# Patient Record
Sex: MALE | Race: WHITE | HISPANIC OR LATINO | URBAN - METROPOLITAN AREA
[De-identification: names, ages, dates, MRNs, and addresses within clinical notes are randomized per-mention and may not be internally consistent; named-entity substitution may affect disease eponyms.]

---

## 2020-08-31 ENCOUNTER — INPATIENT (INPATIENT)
Facility: HOSPITAL | Age: 35
LOS: 24 days | Discharge: HOME | DRG: 753 | End: 2020-09-25
Attending: PSYCHIATRY & NEUROLOGY | Admitting: PSYCHIATRY & NEUROLOGY
Payer: MEDICAID

## 2020-08-31 VITALS — WEIGHT: 165.35 LBS | RESPIRATION RATE: 18 BRPM | OXYGEN SATURATION: 99 % | HEIGHT: 68 IN | TEMPERATURE: 98 F

## 2020-08-31 DIAGNOSIS — F31.9 BIPOLAR DISORDER, UNSPECIFIED: ICD-10-CM

## 2020-08-31 DIAGNOSIS — F12.10 CANNABIS ABUSE, UNCOMPLICATED: ICD-10-CM

## 2020-08-31 DIAGNOSIS — F31.64 BIPOLAR DISORDER, CURRENT EPISODE MIXED, SEVERE, WITH PSYCHOTIC FEATURES: ICD-10-CM

## 2020-08-31 DIAGNOSIS — F31.2 BIPOLAR DISORDER, CURRENT EPISODE MANIC SEVERE WITH PSYCHOTIC FEATURES: ICD-10-CM

## 2020-08-31 PROCEDURE — 80053 COMPREHEN METABOLIC PANEL: CPT

## 2020-08-31 PROCEDURE — 84443 ASSAY THYROID STIM HORMONE: CPT

## 2020-08-31 PROCEDURE — 83036 HEMOGLOBIN GLYCOSYLATED A1C: CPT

## 2020-08-31 PROCEDURE — 80061 LIPID PANEL: CPT

## 2020-08-31 PROCEDURE — 93005 ELECTROCARDIOGRAM TRACING: CPT

## 2020-08-31 PROCEDURE — 82962 GLUCOSE BLOOD TEST: CPT

## 2020-08-31 PROCEDURE — 99223 1ST HOSP IP/OBS HIGH 75: CPT

## 2020-08-31 PROCEDURE — 84484 ASSAY OF TROPONIN QUANT: CPT

## 2020-08-31 PROCEDURE — 85025 COMPLETE CBC W/AUTO DIFF WBC: CPT

## 2020-08-31 PROCEDURE — 36415 COLL VENOUS BLD VENIPUNCTURE: CPT

## 2020-08-31 PROCEDURE — 93970 EXTREMITY STUDY: CPT

## 2020-08-31 PROCEDURE — 85379 FIBRIN DEGRADATION QUANT: CPT

## 2020-08-31 RX ORDER — MAGNESIUM HYDROXIDE 400 MG/1
30 TABLET, CHEWABLE ORAL DAILY
Refills: 0 | Status: DISCONTINUED | OUTPATIENT
Start: 2020-08-31 | End: 2020-09-24

## 2020-08-31 RX ORDER — RISPERIDONE 4 MG/1
1 TABLET ORAL
Refills: 0 | Status: DISCONTINUED | OUTPATIENT
Start: 2020-08-31 | End: 2020-09-03

## 2020-08-31 RX ORDER — ACETAMINOPHEN 500 MG
650 TABLET ORAL EVERY 6 HOURS
Refills: 0 | Status: DISCONTINUED | OUTPATIENT
Start: 2020-08-31 | End: 2020-09-24

## 2020-08-31 RX ORDER — DIPHENHYDRAMINE HCL 50 MG
50 CAPSULE ORAL EVERY 6 HOURS
Refills: 0 | Status: DISCONTINUED | OUTPATIENT
Start: 2020-08-31 | End: 2020-09-24

## 2020-08-31 RX ORDER — TRAZODONE HCL 50 MG
50 TABLET ORAL AT BEDTIME
Refills: 0 | Status: DISCONTINUED | OUTPATIENT
Start: 2020-08-31 | End: 2020-09-25

## 2020-08-31 RX ORDER — HALOPERIDOL DECANOATE 100 MG/ML
5 INJECTION INTRAMUSCULAR EVERY 6 HOURS
Refills: 0 | Status: DISCONTINUED | OUTPATIENT
Start: 2020-08-31 | End: 2020-09-24

## 2020-08-31 RX ORDER — HALOPERIDOL DECANOATE 100 MG/ML
5 INJECTION INTRAMUSCULAR EVERY 6 HOURS
Refills: 0 | Status: DISCONTINUED | OUTPATIENT
Start: 2020-08-31 | End: 2020-09-12

## 2020-08-31 NOTE — CONSULT NOTE ADULT - ASSESSMENT
34 y/o male with no significant medical history admitted to inpatient psychiatry for psychosis.     # Psychosis  -Management per primary psych team    # 34 y/o male with no significant medical history admitted to inpatient psychiatry for psychosis.     # Psychosis  -Management per primary psych team    # Cannabis abuse  -Encourage cessation     DVT ppx  Encourage ambulation

## 2020-08-31 NOTE — BEHAVIORAL HEALTH ASSESSMENT NOTE - PROBLEM SELECTOR PLAN 2
encourage to attend groups for insight and relapse prevention encourage the pt to attend groups for relapse prevention plan

## 2020-08-31 NOTE — BEHAVIORAL HEALTH ASSESSMENT NOTE - OTHER
pt is superficially cooperative, is guarded unkempt pt denies any hallucinations but appears preoccupied

## 2020-08-31 NOTE — PATIENT PROFILE BEHAVIORAL HEALTH - FALL HARM RISK CONCLUSION
having urinary retention since my catheter was removed tonight; I noticed there's a lot of blood in my catheter Universal Safety Interventions

## 2020-08-31 NOTE — BEHAVIORAL HEALTH ASSESSMENT NOTE - NSBHADMITIPSTRENGTH_PSY_A_CORE
Has supportive interpersonal relationships with family, friends or peers/Has access to housing/residential stability/Intelligent

## 2020-08-31 NOTE — BEHAVIORAL HEALTH ASSESSMENT NOTE - NSBHSOCIALHXDETAILSFT_PSY_A_CORE
pt not believing he has a disorder and is refusing medication as he fears "it would mess up my mind"

## 2020-08-31 NOTE — BEHAVIORAL HEALTH ASSESSMENT NOTE - SUICIDE PROTECTIVE FACTORS
Supportive social network of family or friends Catholic beliefs/Supportive social network of family or friends

## 2020-08-31 NOTE — BEHAVIORAL HEALTH ASSESSMENT NOTE - HPI (INCLUDE ILLNESS QUALITY, SEVERITY, DURATION, TIMING, CONTEXT, MODIFYING FACTORS, ASSOCIATED SIGNS AND SYMPTOMS)
Pt is a 35 yr old Cacasian single male , no prior psych hospitalization hx, BIB family to [facility] , with homicidal ideation of wanting to kill his brother on the direction of GOD so that the brother  can be reborn as the patient's son. According to the Pt's father James , pt smoking cannabis daily, not sleeping, talking to self. Pt is a 35 yr old  single male , no prior psych hospitalization hx, BIB family to [facility] , with homicidal ideation of wanting to kill his brother on the direction of GOD so that the brother  can be reborn as the patient's son. According to the Pt's father James , pt smoking cannabis daily, not sleeping, talking to self.  Pt apparently with poor sleep and response to internal stimuli  since 2019 according to the father but he was not brought in for treatment until he started to voice homicidal ideations.  Pt with grandiose delusions. Pt with no thought of needing treatment    According to referral material from [facility] pt indicated that he had a traumatic break up with his girlfriend  of 5 years in Dec 2019 . Pt had since then had "been in contact with GOD and was writing testaments in the bible ." Claimed that he was exercising more, he was denying any homicidal ideation and claimed that he did not know why the family would say he said this." Claims he would get "GOD 's thoughts in his head seeing the holy spirit move his computer in the middle of the night.

## 2020-08-31 NOTE — CONSULT NOTE ADULT - ATTENDING COMMENTS
Case discussed and reviewed in detail. Please note my plan below     34 y/o M with no significant PMH, p/w psychosis and admitted to psych service. No medical complaints reported    *Psychosis   -Management as per psych    *DVT ppx  -Encourage ambulation Case discussed and reviewed in detail. Please note my plan below     34 y/o M with no significant PMH, p/w psychosis and admitted to psych service. No medical complaints reported    *Psychosis   -Management as per psych    *Will need to f/u pending labs     *DVT ppx  -Encourage ambulation

## 2020-08-31 NOTE — BEHAVIORAL HEALTH ASSESSMENT NOTE - DETAILS
reportedly police removed two guns on two occasions he denies any suicidal ideation intent or plan pt responding to internal stimuli reportedly police removed two guns on two separate occasions

## 2020-08-31 NOTE — BEHAVIORAL HEALTH ASSESSMENT NOTE - SUICIDE RISK FACTORS
Impulsivity/Access to lethal methods (pills, firearm, etc.: Ask specifically about presence or absence of a firearm in the home or ease of accessing

## 2020-08-31 NOTE — BEHAVIORAL HEALTH ASSESSMENT NOTE - PRIMARY DX
Bipolar affective disorder, currently manic, severe, with psychotic features Psychosis, unspecified psychosis type

## 2020-08-31 NOTE — BEHAVIORAL HEALTH ASSESSMENT NOTE - PATIENT'S CHIEF COMPLAINT
Pt claims he is a descendant of Bautista [place] and that he is the second coming o Richard and listening to the word of God

## 2020-08-31 NOTE — CONSULT NOTE ADULT - SUBJECTIVE AND OBJECTIVE BOX
PMD: none     HPI:  34 y/o male with no significant PMHx presented as a direct transfer from [facility] where he was brought in by his family for psychosis. Per records, pt verbalizes homicidal ideation of wanting to kill his brother on the direction of GOD so that the brother can be reborn as the patient's son. According to the Pt's father James , pt smoking cannabis daily, not sleeping, talking to self for about 1 year. Today, pt is guarded and refusing to answer questions.     Patient seen and examined at bedside    ALLERGIES: NKA    HOME MEDICATIONS  None       PAST MEDICAL HISTORY  No significant past medical history     PAST SURGICAL HISTORY   No prior surgical history     SOCIAL HISTORY   Denies cigarette smoking. Daily will    FAMILY HISTORY      ROS  CONSTITUTIONAL:   EYES/ENT: No visual changes;  No vertigo or throat pain   NECK: No pain or stiffness  RESPIRATORY: No cough, wheezing, hemoptysis; No shortness of breath  CARDIOVASCULAR: No chest pain or palpitations  GASTROINTESTINAL: No abdominal or epigastric pain. No nausea, vomiting, or hematemesis; No diarrhea or constipation. No melena or hematochezia.  GENITOURINARY: No dysuria, frequency or hematuria  NEUROLOGICAL: No numbness or weakness  SKIN: No itching, burning, rashes, or lesions   Psych:    All other review of systems is negative unless indicated above.    PHYSICAL EXAM    Vital Signs Last 24 Hrs      Gen: NAD     Neck: supple no JVD    Breasts:     Back: nontender     Respiratory: CTA b/l, no wheezing, or rhonchi    Cardiovascular: S1, S2 no murmurs, rubs or gallops    Gastrointestinal: soft, non-tender, + Bowel sounds    Extremities: non-tender, no edema    Neurological: AAO x 3 no focal deficits    Skin: no rashes    Musculoskeletal: 5/5 strength throughout, normal ROM    Psychiatric: appropriate affect      LABS PMD: none     HPI:  36 y/o male with no significant PMHx presented as a direct transfer from [facility] where he was brought in by his family for psychosis. Per records, pt verbalizes homicidal ideation of wanting to kill his brother on the direction of GOD so that the brother can be reborn as the patient's son. According to the Pt's father James , pt smoking cannabis daily, not sleeping, talking for the past year. Today, pt is guarded and refusing to answer questions.     Patient seen and examined at bedside    ALLERGIES: NKA    HOME MEDICATIONS  None     PAST MEDICAL HISTORY  No significant past medical history     PAST SURGICAL HISTORY   No prior surgical history     SOCIAL HISTORY   Denies cigarette smoking. Daily marijuana use. Denies Etoh abuse     FAMILY HISTORY  Denies family history of DM, HTN or mental illness     ROS  CONSTITUTIONAL: no fever, chills or fatigue   EYES/ENT: No visual changes;  No vertigo or throat pain   NECK: No pain or stiffness  RESPIRATORY: No cough, wheezing, hemoptysis; No shortness of breath  CARDIOVASCULAR: No chest pain or palpitations  GASTROINTESTINAL: No abdominal or epigastric pain. No nausea, vomiting, or hematemesis; No diarrhea or constipation. No melena or hematochezia.  GENITOURINARY: No dysuria, frequency or hematuria  NEUROLOGICAL: No numbness or weakness  SKIN: No itching, burning, rashes, or lesions   Psych:  hallucinations   All other review of systems is negative unless indicated above.    PHYSICAL EXAM    Vital Signs Last 24 Hrs  T(C): 36.9 (31 Aug 2020 15:44), Max: 36.9 (31 Aug 2020 15:44)  T(F): 98.4 (31 Aug 2020 15:44), Max: 98.4 (31 Aug 2020 15:44)  HR: 82  BP: 104/73  RR: 18 (31 Aug 2020 15:44) (18 - 18)  SpO2: 99% (31 Aug 2020 15:44) (99% - 99%)      Gen: NAD     Neck: supple     Back: nontender     Respiratory: CTA b/l, no wheezing, or rhonchi    Cardiovascular: S1, S2 no murmurs, rubs or gallops    Gastrointestinal: soft, non-tender, + Bowel sounds    Extremities: non-tender, no edema    Neurological: AAO x 3 no focal deficits    Skin: no rashes    Musculoskeletal: 5/5 strength throughout, normal ROM    Psychiatric: appropriate affect PMD: none     HPI:  36 y/o male with no significant medical history presented as a direct transfer from [facility] where he was brought in by his family for psychosis. Per records, pt verbalizes homicidal ideation of wanting to kill his brother on the direction of GOD so that the brother can be reborn as the patient's son. According to the Pt's father James , pt smoking cannabis daily, not sleeping, talking for the past year. Today, writer tried to interview pt however history is limited because pt is guarded and is a poor historian.     Patient seen and examined at bedside    ALLERGIES: NKA    HOME MEDICATIONS  None     PAST MEDICAL HISTORY  No significant past medical history     PAST SURGICAL HISTORY   No prior surgical history     SOCIAL HISTORY   Denies cigarette smoking. Daily marijuana use. Denies Etoh abuse     FAMILY HISTORY  Denies family history of DM, HTN or mental illness     ROS  CONSTITUTIONAL: no fever, chills or fatigue   EYES/ENT: No visual changes;  No vertigo or throat pain   NECK: No pain or stiffness  RESPIRATORY: No cough, wheezing, hemoptysis; No shortness of breath  CARDIOVASCULAR: No chest pain or palpitations  GASTROINTESTINAL: No abdominal or epigastric pain. No nausea, vomiting, or hematemesis; No diarrhea or constipation. No melena or hematochezia.  GENITOURINARY: No dysuria, frequency or hematuria  NEUROLOGICAL: No numbness or weakness  SKIN: No itching, burning, rashes, or lesions   Psych:  hallucinations   All other review of systems is negative unless indicated above.    PHYSICAL EXAM    Vital Signs Last 24 Hrs  T(C): 36.9 (31 Aug 2020 15:44), Max: 36.9 (31 Aug 2020 15:44)  T(F): 98.4 (31 Aug 2020 15:44), Max: 98.4 (31 Aug 2020 15:44)  HR: 82  BP: 104/73  RR: 18 (31 Aug 2020 15:44) (18 - 18)  SpO2: 99% (31 Aug 2020 15:44) (99% - 99%)      Gen: calm and cooperative male in no acute distress     Neck: supple     Back: nontender     Respiratory: CTA b/l, no wheezing, or rhonchi    Cardiovascular: S1, S2 no murmurs, rubs or gallops    Gastrointestinal: soft, non-tender, + Bowel sounds    Extremities: non-tender, no edema    Neurological: AAO x 3 no focal deficits    Skin: no rashes    Musculoskeletal: 5/5 strength throughout, normal ROM    Psychiatric: paranoid and guarded

## 2020-08-31 NOTE — BEHAVIORAL HEALTH ASSESSMENT NOTE - SUMMARY
35 yr old single male living with family , no prior psych treatment in or outpt , is unemployed . He has no insight and believes he is not in need of treatment. Pt with daily use of cannabis and for the last year has been with a change in sleep pattern, increased delusions of grandiosity  and now according to family has voiced homicidal ideation towards his brother.  Pt himself denies any suicidal or homicidal ideation intent or plan

## 2020-08-31 NOTE — BEHAVIORAL HEALTH ASSESSMENT NOTE - NSBHCHARTREVIEWVS_PSY_A_CORE FT
Vital Signs Last 24 Hrs  T(C): 36.9 (31 Aug 2020 15:44), Max: 36.9 (31 Aug 2020 15:44)  T(F): 98.4 (31 Aug 2020 15:44), Max: 98.4 (31 Aug 2020 15:44)  HR: --  BP: --  BP(mean): --  RR: 18 (31 Aug 2020 15:44) (18 - 18)  SpO2: 99% (31 Aug 2020 15:44) (99% - 99%)

## 2020-09-01 DIAGNOSIS — F12.159 CANNABIS ABUSE WITH PSYCHOTIC DISORDER, UNSPECIFIED: ICD-10-CM

## 2020-09-01 DIAGNOSIS — F29 UNSPECIFIED PSYCHOSIS NOT DUE TO A SUBSTANCE OR KNOWN PHYSIOLOGICAL CONDITION: ICD-10-CM

## 2020-09-01 LAB
A1C WITH ESTIMATED AVERAGE GLUCOSE RESULT: 5.6 % — SIGNIFICANT CHANGE UP (ref 4–5.6)
ALBUMIN SERPL ELPH-MCNC: 4.6 G/DL — SIGNIFICANT CHANGE UP (ref 3.3–5)
ALP SERPL-CCNC: 101 U/L — SIGNIFICANT CHANGE UP (ref 40–120)
ALT FLD-CCNC: 64 U/L — SIGNIFICANT CHANGE UP (ref 12–78)
ANION GAP SERPL CALC-SCNC: 7 MMOL/L — SIGNIFICANT CHANGE UP (ref 5–17)
AST SERPL-CCNC: 97 U/L — HIGH (ref 15–37)
BASOPHILS # BLD AUTO: 0.07 K/UL — SIGNIFICANT CHANGE UP (ref 0–0.2)
BASOPHILS NFR BLD AUTO: 0.6 % — SIGNIFICANT CHANGE UP (ref 0–2)
BILIRUB SERPL-MCNC: 0.6 MG/DL — SIGNIFICANT CHANGE UP (ref 0.2–1.2)
BUN SERPL-MCNC: 11 MG/DL — SIGNIFICANT CHANGE UP (ref 7–23)
CALCIUM SERPL-MCNC: 10 MG/DL — SIGNIFICANT CHANGE UP (ref 8.5–10.1)
CHLORIDE SERPL-SCNC: 103 MMOL/L — SIGNIFICANT CHANGE UP (ref 96–108)
CHOLEST SERPL-MCNC: 181 MG/DL — SIGNIFICANT CHANGE UP (ref 10–199)
CO2 SERPL-SCNC: 29 MMOL/L — SIGNIFICANT CHANGE UP (ref 22–31)
CREAT SERPL-MCNC: 1.11 MG/DL — SIGNIFICANT CHANGE UP (ref 0.5–1.3)
EOSINOPHIL # BLD AUTO: 0.04 K/UL — SIGNIFICANT CHANGE UP (ref 0–0.5)
EOSINOPHIL NFR BLD AUTO: 0.4 % — SIGNIFICANT CHANGE UP (ref 0–6)
ESTIMATED AVERAGE GLUCOSE: 114 MG/DL — SIGNIFICANT CHANGE UP (ref 68–114)
GLUCOSE SERPL-MCNC: 82 MG/DL — SIGNIFICANT CHANGE UP (ref 70–99)
HCT VFR BLD CALC: 50.3 % — HIGH (ref 39–50)
HDLC SERPL-MCNC: 76 MG/DL — SIGNIFICANT CHANGE UP
HGB BLD-MCNC: 16.2 G/DL — SIGNIFICANT CHANGE UP (ref 13–17)
IMM GRANULOCYTES NFR BLD AUTO: 0.3 % — SIGNIFICANT CHANGE UP (ref 0–1.5)
LIPID PNL WITH DIRECT LDL SERPL: 88 MG/DL — SIGNIFICANT CHANGE UP
LYMPHOCYTES # BLD AUTO: 1.77 K/UL — SIGNIFICANT CHANGE UP (ref 1–3.3)
LYMPHOCYTES # BLD AUTO: 16.1 % — SIGNIFICANT CHANGE UP (ref 13–44)
MCHC RBC-ENTMCNC: 29.6 PG — SIGNIFICANT CHANGE UP (ref 27–34)
MCHC RBC-ENTMCNC: 32.2 GM/DL — SIGNIFICANT CHANGE UP (ref 32–36)
MCV RBC AUTO: 91.8 FL — SIGNIFICANT CHANGE UP (ref 80–100)
MONOCYTES # BLD AUTO: 0.91 K/UL — HIGH (ref 0–0.9)
MONOCYTES NFR BLD AUTO: 8.3 % — SIGNIFICANT CHANGE UP (ref 2–14)
NEUTROPHILS # BLD AUTO: 8.14 K/UL — HIGH (ref 1.8–7.4)
NEUTROPHILS NFR BLD AUTO: 74.3 % — SIGNIFICANT CHANGE UP (ref 43–77)
PLATELET # BLD AUTO: 323 K/UL — SIGNIFICANT CHANGE UP (ref 150–400)
POTASSIUM SERPL-MCNC: 4.2 MMOL/L — SIGNIFICANT CHANGE UP (ref 3.5–5.3)
POTASSIUM SERPL-SCNC: 4.2 MMOL/L — SIGNIFICANT CHANGE UP (ref 3.5–5.3)
PROT SERPL-MCNC: 8.3 GM/DL — SIGNIFICANT CHANGE UP (ref 6–8.3)
RBC # BLD: 5.48 M/UL — SIGNIFICANT CHANGE UP (ref 4.2–5.8)
RBC # FLD: 12.8 % — SIGNIFICANT CHANGE UP (ref 10.3–14.5)
SODIUM SERPL-SCNC: 139 MMOL/L — SIGNIFICANT CHANGE UP (ref 135–145)
TOTAL CHOLESTEROL/HDL RATIO MEASUREMENT: 2.4 RATIO — LOW (ref 3.4–9.6)
TRIGL SERPL-MCNC: 87 MG/DL — SIGNIFICANT CHANGE UP (ref 10–149)
TSH SERPL-MCNC: 0.76 UU/ML — SIGNIFICANT CHANGE UP (ref 0.34–4.82)
WBC # BLD: 10.96 K/UL — HIGH (ref 3.8–10.5)
WBC # FLD AUTO: 10.96 K/UL — HIGH (ref 3.8–10.5)

## 2020-09-01 PROCEDURE — 99233 SBSQ HOSP IP/OBS HIGH 50: CPT

## 2020-09-01 PROCEDURE — 99252 IP/OBS CONSLTJ NEW/EST SF 35: CPT

## 2020-09-01 RX ADMIN — Medication 50 MILLIGRAM(S): at 20:07

## 2020-09-01 NOTE — PROGRESS NOTE BEHAVIORAL HEALTH - OTHER
unkempt pt is superficially cooperative, is guarded pt denies any hallucinations but appears preoccupied

## 2020-09-01 NOTE — PROGRESS NOTE BEHAVIORAL HEALTH - THOUGHT PROCESS
Impaired reasoning/Disorganized/Tangential/Flight of ideas/Illogical Linear/Flight of ideas/Impaired reasoning/Illogical

## 2020-09-01 NOTE — PROGRESS NOTE BEHAVIORAL HEALTH - NSBHCHARTREVIEWVS_PSY_A_CORE FT
Vital Signs Last 24 Hrs  T(C): 36.9 (31 Aug 2020 15:44), Max: 36.9 (31 Aug 2020 15:44)  T(F): 98.4 (31 Aug 2020 15:44), Max: 98.4 (31 Aug 2020 15:44)  HR: --  BP: --  BP(mean): --  RR: 18 (31 Aug 2020 15:44) (18 - 18)  SpO2: 99% (31 Aug 2020 15:44) (99% - 99%) Vital Signs Last 24 Hrs  T(C): 36.8 (01 Sep 2020 07:36), Max: 36.8 (01 Sep 2020 07:36)  T(F): 98.2 (01 Sep 2020 07:36), Max: 98.2 (01 Sep 2020 07:36)  HR: --  BP: --  BP(mean): --  RR: 18 (01 Sep 2020 07:36) (18 - 18)  SpO2: 100% (01 Sep 2020 07:36) (100% - 100%)

## 2020-09-01 NOTE — PROGRESS NOTE BEHAVIORAL HEALTH - NSBHCHARTREVIEWINVESTIGATE_PSY_A_CORE FT
reordered. QRS axis to [] ° and NSR at a rate of [] BPM. There was no atrial enlargement. There was no ventricular hypertrophy. There were no ST-T changes and all intervals were normal.

## 2020-09-01 NOTE — PROGRESS NOTE BEHAVIORAL HEALTH - NSBHFUPINTERVALHXFT_PSY_A_CORE
Pt with more goal directed speech and improved eye contact .  Pt is alert and superficially cooperative.  He denies any hallucination, denies any suicidal ideation intent or plan.  He denied any homicidal ideation intent or plan .  Pt claims "aside from remembering the police coming to get me , that's it that is all I remember ."  Pt verified no hx of any psych hospitalization .  Pt smoking cannabis daily .. Pt with less preoccupation with own thoughts. He is focused on not needing any medication. pt not believing he has a disorder and is refusing medication as he fears "it would mess up my mind"

## 2020-09-01 NOTE — PROGRESS NOTE BEHAVIORAL HEALTH - RISK ASSESSMENT
low risk  Acute: pt now denies any hallucination ,   mitigating; pt denies any suicidal or homicidal ideation intent or plan

## 2020-09-02 PROCEDURE — 99233 SBSQ HOSP IP/OBS HIGH 50: CPT

## 2020-09-02 PROCEDURE — 93010 ELECTROCARDIOGRAM REPORT: CPT

## 2020-09-02 NOTE — PROGRESS NOTE BEHAVIORAL HEALTH - NSBHFUPINTERVALHXFT_PSY_A_CORE
Pt's demeanor changed quickly in the presence of his parents and he was irritable, brian ,abrupt and dismissive.     He is the" second begotten son of God " "I am human as I have sin." He is to be the "Satan mariano of the Light and the Jews will bow to me as  I convert them all to Orthodox. " He receives signs that prove to him "what I know is the truth no one questions the word of God or else they be cast to Hell. When God tells me what I should do I cannot question  I have only to do it cause I don't want to go to Hell. He speaks to me as a thought not in words so no I hear no voices." "when I say these things are so I cannot lie for it is the truth "I did not want to reveal to the world who I am but I have 7 entities in me and I was ascended on August 16 and the reveal of my entities are more intense and fast when I use weed." "I know what I know is true as I have a second molar that it to receive a crown I will be Sebastien "I am the cornerstone of the House of God I am to be the Sebastien of [place] I am in charge and no one tells me what to do , no one can stop me."  :I am the reincarnate of [facility], of [place] so I will take charge of the world, control it, and redesign it.  He showed a video he posted of himself in the persona of the Satan Mariano ,looking into the camera snarling and announcing that he is the "Armageddon ".   Pt with grandiose and paranoid delusions, tangential and ANITA. Pt refusing medication Pt's demeanor changed quickly in the presence of his parents and he was irritable, brian ,abrupt and dismissive.   Pt insisted that he "never said I wanted to kill my brother " "I never lie  and only carry out what my Father tells me to do" Pt went on to explain that he  is the" second begotten son of God " "I am human as I have sin." "Richard is the first begotten son"  Pt  is to be the "Satan mariano of the Light and the Jews will bow to me as  I convert them all to Uatsdin. " He receives signs that prove to him "what I know is the truth no one questions the word of God or else they be cast to Hell. When God tells me what I should do I cannot question  I have only to do it cause I don't want to go to Hell. He speaks to me as a thought not in words so no I hear no voices." "when I say these things are so I cannot lie for it is the truth "I did not want to reveal to the world who I am but I have 7 entities in me and I was ascended on August 16 and the reveal of my entities are more intense and fast when I use weed." "I know what I know is true as I have a second molar that it to receive a crown I will be Sebastien "I am the cornerstone of the House of God I am to be the Sebastien of [place] I am in charge and no one tells me what to do , no one can stop me."  :I am the reincarnate of [facility], of [place] so I will take charge of the world, control it, and redesign it.  He showed a video he posted of himself in the persona of the Satcristino Mariano ,looking into the camera snarling and announcing that he is the "Armageddon ".   Pt with grandiose and paranoid delusions, tangential and ANITA. Pt refusing medication Pt's demeanor changed quickly in the presence of his parents and he was irritable, brian ,abrupt and dismissive.   Pt insisted that he "never said I wanted to kill my brother " "I never lie  and only carry out what my Father tells me to do" Pt went on to explain that he  is the" second begotten son of God " "I am human as I have sin." "Richard is the first begotten son"  Pt  is to be the "Satan mariano of the Light and the Jews will bow to me as  I convert them all to Taoism. " He receives signs that prove to him "what I know is the truth no one questions the word of God or else they be cast to Hell. When God tells me what I should do I cannot question  I have only to do it cause I don't want to go to Hell. He speaks to me as a thought not in words so no I hear no voices." "when I say these things are so I cannot lie for it is the truth "I did not want to reveal to the world who I am but I have 7 entities in me and I was ascended on August 16 and the reveal of my entities are more intense and fast when I use weed." "I know what I know is true as I have a second molar that it to receive a crown I will be Sebastien "I am the cornerstone of the House of God I am to be the Sebastien of [place] I am in charge and no one tells me what to do , no one can stop me."  :I am the reincarnate of [facility], of [place] so I will take charge of the world, control it, and redesign it.  He asked his mother to give him his phone that she was holding for him, he then  showed a video he posted of himself in the persona of the Michelle Mariano ,looking into the camera snarling and announcing that he is the "Armageddon ".   Pt with grandiose and paranoid delusions, tangential and ANITA. Pt refusing medication as he claim "it would mess up my mind"    **Pt was served by the [facility]  on behalf of the [address] of the [facility] case # [id number] , that a Temporary Extreme Risk Protection Order CPLR 6342 was GRANTED, and that a hearing needed to be held to determine if a final Extreme Risk Protection Order would be issued.  The Temporary Order was granted based on: " Respondent made statements that God was telling him to kill his brother Thiago. Handgun and ammunition were turned over to [employer]. " The Court finds upon this information that the respondent is a threat to himself and others. Pt's demeanor changed quickly in the presence of his parents and he was irritable, brian ,abrupt and dismissive.   Pt insisted that he "never said I wanted to kill my brother " "I never lie  and only carry out what my Father tells me to do" Pt went on to explain that he  is the" second begotten son of God " "I am human as I have sin." "Richard is the first begotten son"  Pt  is to be the "Satcristino mariano of the [facility] and the Jews will bow to me as  I convert them all to Anabaptist. " He receives signs that prove to him "what I know is the truth no one questions the word of God or else they be cast to Hell. When God tells me what I should do I cannot question  I have only to do it cause I don't want to go to Hell. He speaks to me as a thought not in words so no I hear no voices." "when I say these things are so I cannot lie for it is the truth "I did not want to reveal to the world who I am but I have 7 entities in me and I was ascended on August 16 and the reveal of my entities are more intense and fast when I use weed." "I know what I know is true as I have a second molar that it to receive a crown I will be Sebastien "I am the cornerstone of the House of God I am to be the Prince of [employer] Saturnino [place] I am in charge and no one tells me what to do , no one can stop me."  :I am the reincarnate of [facility], of [facility] so I will take charge of the world, control it, and redesign it.  He asked his mother to give him his phone that she was holding for him, he then  showed a video he posted of himself in the persona of the Satcristino Mariano ,looking into the camera snarling and announcing that he is the "Armageddon ".   Pt with grandiose and paranoid delusions, tangential and ANITA. Pt refusing medication as he claim "it would mess up my mind"    **Pt was served by the [facility]  on behalf of the [place] [address] of the [facility] case # [id number] , that a Temporary Extreme Risk Protection Order CPLR 6342 was GRANTED, and that a hearing needed to be held to determine if a final Extreme Risk Protection Order would be issued.  The Temporary Order was granted based on: " Respondent made statements that God was telling him to kill his brother Thiago. Handgun and ammunition were turned over to [employer]. " The Court finds upon this information that the respondent is a threat to himself and others.

## 2020-09-02 NOTE — PROGRESS NOTE BEHAVIORAL HEALTH - RISK ASSESSMENT
moderate risk  acute: hallucination , grandiose, bizarre and paranoid delusions, impulsive   mitigating : denies any suicidal ideation intent or plan  chronic: lack of insight, use of cannabis

## 2020-09-02 NOTE — PROGRESS NOTE BEHAVIORAL HEALTH - THOUGHT PROCESS
Flight of ideas/Illogical/Impaired reasoning/Linear Tangential/Flight of ideas/Illogical/Impaired reasoning/Overinclusive/Circumstantial

## 2020-09-02 NOTE — PROGRESS NOTE BEHAVIORAL HEALTH - OTHER
pt is superficially cooperative, is guarded pt denies any hallucinations but appears preoccupied unkempt pt shouting that he cannot be told what to do "I am in charge" pt denies any hallucinations but appears preoccupied and messages as "thoughts"

## 2020-09-02 NOTE — PROGRESS NOTE BEHAVIORAL HEALTH - NSBHCHARTREVIEWVS_PSY_A_CORE FT
Vital Signs Last 24 Hrs  T(C): 36.9 (02 Sep 2020 08:05), Max: 36.9 (02 Sep 2020 08:05)  T(F): 98.4 (02 Sep 2020 08:05), Max: 98.4 (02 Sep 2020 08:05)  HR: --  BP: --  BP(mean): --  RR: 18 (02 Sep 2020 08:05) (18 - 18)  SpO2: 100% (02 Sep 2020 08:05) (100% - 100%)

## 2020-09-03 PROCEDURE — 99233 SBSQ HOSP IP/OBS HIGH 50: CPT

## 2020-09-03 RX ORDER — HALOPERIDOL DECANOATE 100 MG/ML
7.5 INJECTION INTRAMUSCULAR ONCE
Refills: 0 | Status: COMPLETED | OUTPATIENT
Start: 2020-09-03 | End: 2020-09-03

## 2020-09-03 RX ORDER — HALOPERIDOL DECANOATE 100 MG/ML
5 INJECTION INTRAMUSCULAR ONCE
Refills: 0 | Status: DISCONTINUED | OUTPATIENT
Start: 2020-09-03 | End: 2020-09-24

## 2020-09-03 RX ORDER — RISPERIDONE 4 MG/1
2 TABLET ORAL
Refills: 0 | Status: DISCONTINUED | OUTPATIENT
Start: 2020-09-03 | End: 2020-09-12

## 2020-09-03 RX ORDER — DIPHENHYDRAMINE HCL 50 MG
50 CAPSULE ORAL ONCE
Refills: 0 | Status: DISCONTINUED | OUTPATIENT
Start: 2020-09-03 | End: 2020-09-24

## 2020-09-03 RX ADMIN — Medication 50 MILLIGRAM(S): at 16:18

## 2020-09-03 RX ADMIN — Medication 2 MILLIGRAM(S): at 16:18

## 2020-09-03 RX ADMIN — HALOPERIDOL DECANOATE 7.5 MILLIGRAM(S): 100 INJECTION INTRAMUSCULAR at 16:24

## 2020-09-03 NOTE — PROGRESS NOTE BEHAVIORAL HEALTH - OTHER
pt denies any hallucinations but appears preoccupied and messages as "thoughts" pt is superficially cooperative, is guarded pt shouting that he cannot be told what to do "I am in charge" unkempt

## 2020-09-03 NOTE — PROGRESS NOTE BEHAVIORAL HEALTH - NSBHCHARTREVIEWVS_PSY_A_CORE FT
Vital Signs Last 24 Hrs  T(C): 37.1 (03 Sep 2020 07:09), Max: 37.1 (03 Sep 2020 07:09)  T(F): 98.8 (03 Sep 2020 07:09), Max: 98.8 (03 Sep 2020 07:09)  HR: 111 (03 Sep 2020 17:13) (111 - 111)  BP: 129/65 (03 Sep 2020 17:13) (129/65 - 129/65)  BP(mean): --  RR: 14 (03 Sep 2020 07:09) (14 - 14)  SpO2: 100% (03 Sep 2020 07:09) (100% - 100%)

## 2020-09-03 NOTE — PROGRESS NOTE BEHAVIORAL HEALTH - SUMMARY
35 yr old single male living with family , no prior psych treatment in or outpt , is unemployed . He has no insight and believes he is not in need of treatment. Pt with daily use of cannabis and for the last year has been with a change in sleep pattern, increased delusions of grandiosity  and now according to family has voiced homicidal ideation towards his brother.  Pt himself denies any suicidal or homicidal ideation intent or plan    Hospitalization course:  9/3/2020 Pt continued with refusing to take medication .  Pt with escalating aggressive behavior and was threatening to kill staff and peers , remains with paranoid and grandiose delusions.

## 2020-09-03 NOTE — PROGRESS NOTE BEHAVIORAL HEALTH - RISK ASSESSMENT
high risk  acute: hallucination , grandiose, bizarre and paranoid delusions, impulsive , aggressive behavior, threatening    mitigating : denies any suicidal ideation intent or plan  chronic: lack of insight, use of cannabis

## 2020-09-03 NOTE — PROGRESS NOTE BEHAVIORAL HEALTH - NSBHFUPINTERVALHXFT_PSY_A_CORE
Pt attended groups and according to the the therapist pt was preoccupied and was repeatedly gesturing oddly and then was tilting his chair at the risk of falling .  Apparently the therapist tried to warn the pt as she believed he was really going to fall and the pt became irate and was yelling that no one tells him what to do. He later decided to leave the group but not before pointing at the therapist and angrily threatening " I'll leave but I will be back for you"  Pt later continued to escalate and needed prn medication.  Pt also handed in a 4 page letter which illustrated  his paranoid and grandiose delusions Pt attended groups and according to the therapist pt was preoccupied and was repeatedly gesturing oddly and then was tilting his chair at the risk of falling .  Apparently the therapist tried to warn the pt as she believed he was really going to fall and the pt became irate and was yelling that no one tells him what to do. He later decided to leave the group but not before pointing at the therapist and angrily threatening " I'll leave but I will be back for you"  Pt later continued to escalate and needed prn medication.  Pt also handed in a 4 page letter which illustrated  his paranoid and grandiose delusions

## 2020-09-03 NOTE — CHART NOTE - NSCHARTNOTEFT_GEN_A_CORE
"I rule I am the [place]iano of Mar[place] , no one can control me. When I get out I will get you in front of your family. You touch me I will make you regret it. . I am [place] I am in control. You will all regret it .You will all pay for it with your lives . Nikita my words I do not lie you will all die  you don't know who I am.. "  Pt with escalating aggression. Pt threatening to peers and staff alike. Pt needing prn medication as pt with grandiose, bizarre  and paranoid delusions and is at risk for injury to self and others.   Pt received haldol 7.5mg IM , ativan 2mg IM and benadryl 50mg IM "I rule I am the [place] of Mar[place] , no one can control me. When I get out I will get you in front of your family. You touch me I will make you regret it. . I am [place] I am in control. You will all regret it .You will all pay for it with your lives . Nikita my words I do not lie you will all die  you don't know who I am what I can do . You are all dead. " Pt yelling, menacing, threatening. with  escalating aggression. towards peers and staff alike. Pt needing prn medication as pt with grandiose, bizarre  and paranoid delusions and is at risk for injury to self and others.   Pt received haldol 7.5mg IM , ativan 2mg IM and benadryl 50mg IM

## 2020-09-04 PROCEDURE — 99231 SBSQ HOSP IP/OBS SF/LOW 25: CPT

## 2020-09-04 NOTE — PROGRESS NOTE BEHAVIORAL HEALTH - PROBLEM SELECTOR PROBLEM 1
Psychosis, unspecified psychosis type Bipolar affective disorder, currently manic, severe, with psychotic features

## 2020-09-04 NOTE — PROGRESS NOTE BEHAVIORAL HEALTH - NSBHCHARTREVIEWVS_PSY_A_CORE FT
Vital Signs Last 24 Hrs  T(C): 37.2 (04 Sep 2020 07:41), Max: 37.2 (04 Sep 2020 07:41)  T(F): 98.9 (04 Sep 2020 07:41), Max: 98.9 (04 Sep 2020 07:41)  HR: 111 (03 Sep 2020 17:13) (111 - 111)  BP: 129/65 (03 Sep 2020 17:13) (129/65 - 129/65)  BP(mean): --  RR: 14 (04 Sep 2020 07:41) (14 - 14)  SpO2: 96% (04 Sep 2020 07:41) (96% - 96%)

## 2020-09-04 NOTE — PROGRESS NOTE BEHAVIORAL HEALTH - NSBHFUPINTERVALHXFT_PSY_A_CORE
Pt in the morning was still irritable and threatening to "reveal my inner self" (el). However later on presented a letter claiming that he would be less aggressive but signed the note [employer] Saturn[employer] Hicks.  Pt continue with paranoid, bizarre , grandiose delusion . Pt still refusing medication

## 2020-09-04 NOTE — PROGRESS NOTE BEHAVIORAL HEALTH - RISK ASSESSMENT
high risk  acute: hallucination , grandiose, bizarre and paranoid delusions, impulsive , aggressive behavior, threatening    mitigating : denies any suicidal ideation intent or plan  chronic: lack of insight, use of cannabis high risk  acute: hallucination , grandiose, bizarre and paranoid delusions, impulsive , aggressive behavior, threatening, continued refusal of medication     mitigating : denies any suicidal ideation intent or plan  chronic: lack of insight, use of cannabis

## 2020-09-04 NOTE — PROGRESS NOTE BEHAVIORAL HEALTH - SUMMARY
35 yr old single male living with family , no prior psych treatment in or outpt , is unemployed . He has no insight and believes he is not in need of treatment. Pt with daily use of cannabis and for the last year has been with a change in sleep pattern, increased delusions of grandiosity  and now according to family has voiced homicidal ideation towards his brother.  Pt himself denies any suicidal or homicidal ideation intent or plan    Hospitalization course:  9/3/2020 Pt continued with refusing to take medication .  Pt with escalating aggressive behavior and was threatening to kill staff and peers , remains with paranoid and grandiose delusions. 35 yr old single male living with family , no prior psych treatment in or outpt , is unemployed . He has no insight and believes he is not in need of treatment. Pt with daily use of cannabis and for the last year has been with a change in sleep pattern, increased delusions of grandiosity  and now according to family has voiced homicidal ideation towards his brother.  Pt himself denies any suicidal or homicidal ideation intent or plan    Hospitalization course:  9/4/2020 Pt with continued delusional thoughts , impulsivity   9/3/2020 Pt continued with refusing to take medication .  Pt with escalating aggressive behavior and was threatening to kill staff and peers , remains with paranoid and grandiose delusions.

## 2020-09-04 NOTE — PROGRESS NOTE BEHAVIORAL HEALTH - OTHER
unkempt pt is superficially cooperative, is guarded pt shouting that he cannot be told what to do "I am in charge" pt denies any hallucinations but appears preoccupied and messages as "thoughts"

## 2020-09-05 PROCEDURE — 99231 SBSQ HOSP IP/OBS SF/LOW 25: CPT

## 2020-09-05 NOTE — PROGRESS NOTE BEHAVIORAL HEALTH - NSBHCHARTREVIEWVS_PSY_A_CORE FT
Vital Signs Last 24 Hrs  T(C): 36.9 (05 Sep 2020 07:38), Max: 36.9 (05 Sep 2020 07:38)  T(F): 98.5 (05 Sep 2020 07:38), Max: 98.5 (05 Sep 2020 07:38)  HR: --  BP: --  BP(mean): --  RR: 18 (05 Sep 2020 07:38) (18 - 18)  SpO2: 100% (05 Sep 2020 07:38) (100% - 100%)

## 2020-09-05 NOTE — PROGRESS NOTE BEHAVIORAL HEALTH - RISK ASSESSMENT
high risk  acute: hallucination , grandiose, bizarre and paranoid delusions, impulsive , aggressive behavior, threatening, continued refusal of medication     mitigating : denies any suicidal ideation intent or plan  chronic: lack of insight, use of cannabis

## 2020-09-05 NOTE — PROGRESS NOTE BEHAVIORAL HEALTH - NSBHFUPINTERVALHXFT_PSY_A_CORE
Pt in the morning was still irritable and threatening to "reveal my inner self" (el). However later on presented a letter claiming that he would be less aggressive but signed the note [employer] Saturn[employer] Chavez.  Pt continue with paranoid, bizarre , grandiose delusion . Pt still refusing medication.    Covering MD: 09/05/2020: Patient was seen in the room. He was wearing shorts, with a long beard, disheveled. He has been refusing meds since he came here. He was medicated earlier as he feels that he is here for revelation for him--when asked for him is .. " He on Bible ". Very delusional, suspicious and defiant, but no agitation or aggression other the incident on that day when he was medicated with PRN IM meds no other issues till now.

## 2020-09-05 NOTE — PROGRESS NOTE BEHAVIORAL HEALTH - SUMMARY
35 yr old single male living with family , no prior psych treatment in or outpt , is unemployed . He has no insight and believes he is not in need of treatment. Pt with daily use of cannabis and for the last year has been with a change in sleep pattern, increased delusions of grandiosity  and now according to family has voiced homicidal ideation towards his brother.  Pt himself denies any suicidal or homicidal ideation intent or plan    Hospitalization course:  9/4/2020 Pt with continued delusional thoughts , impulsivity   9/3/2020 Pt continued with refusing to take medication .  Pt with escalating aggressive behavior and was threatening to kill staff and peers , remains with paranoid and grandiose delusions.

## 2020-09-06 PROCEDURE — 99231 SBSQ HOSP IP/OBS SF/LOW 25: CPT

## 2020-09-06 NOTE — PROGRESS NOTE BEHAVIORAL HEALTH - NSBHCHARTREVIEWVS_PSY_A_CORE FT
Vital Signs Last 24 Hrs  T(C): 37.3 (06 Sep 2020 08:17), Max: 37.3 (06 Sep 2020 08:17)  T(F): 99.1 (06 Sep 2020 08:17), Max: 99.1 (06 Sep 2020 08:17)  HR: --  BP: --  BP(mean): --  RR: 18 (06 Sep 2020 08:17) (18 - 18)  SpO2: 100% (06 Sep 2020 08:17) (100% - 100%)

## 2020-09-06 NOTE — PROGRESS NOTE BEHAVIORAL HEALTH - NSBHFUPINTERVALHXFT_PSY_A_CORE
Pt in the morning was still irritable and threatening to "reveal my inner self" (el). However later on presented a letter claiming that he would be less aggressive but signed the note [employer] Saturn[employer] Chavez.  Pt continue with paranoid, bizarre , grandiose delusion . Pt still refusing medication.    Covering MD: 09/06/2020: Patient was seen in the room. He was wearing shorts, with a long beard, disheveled. He has been refusing meds since he came here. He prefers to stay in room, limited interaction with staffs, no aggression/agitation since last Friday, overall doing well with no meds till now.

## 2020-09-07 PROCEDURE — 99231 SBSQ HOSP IP/OBS SF/LOW 25: CPT

## 2020-09-07 NOTE — PROGRESS NOTE BEHAVIORAL HEALTH - OTHER
pt denies any hallucinations but appears preoccupied and messages as "thoughts" unkempt pt is superficially cooperative, is guarded pt shouting that he cannot be told what to do "I am in charge"

## 2020-09-07 NOTE — PROGRESS NOTE BEHAVIORAL HEALTH - NSBHFUPINTERVALHXFT_PSY_A_CORE
Pt in the morning was still irritable and threatening to "reveal my inner self" (el). However later on presented a letter claiming that he would be less aggressive but signed the note [employer] Saturnino Chavez.  Pt continue with paranoid, bizarre , grandiose delusion . Pt still refusing medication.    Covering MD: 09/07/2020: Patient was seen in the room. He was wearing shorts, with a long beard, disheveled. He has been refusing meds since he came " You don't know that I don't take meds ". He was not aggressive or agitated, but seems to have a loud pitch when gets excited.

## 2020-09-07 NOTE — PROGRESS NOTE BEHAVIORAL HEALTH - NSBHCHARTREVIEWVS_PSY_A_CORE FT
Vital Signs Last 24 Hrs  T(C): 37.2 (07 Sep 2020 08:16), Max: 37.2 (07 Sep 2020 08:16)  T(F): 99 (07 Sep 2020 08:16), Max: 99 (07 Sep 2020 08:16)  HR: --  BP: --  BP(mean): --  RR: 14 (07 Sep 2020 08:16) (14 - 14)  SpO2: 100% (07 Sep 2020 08:16) (100% - 100%)

## 2020-09-08 PROCEDURE — 99231 SBSQ HOSP IP/OBS SF/LOW 25: CPT

## 2020-09-08 RX ADMIN — RISPERIDONE 2 MILLIGRAM(S): 4 TABLET ORAL at 21:07

## 2020-09-08 NOTE — PROGRESS NOTE BEHAVIORAL HEALTH - NSBHCHARTREVIEWVS_PSY_A_CORE FT
Vital Signs Last 24 Hrs  T(C): 36.8 (08 Sep 2020 07:19), Max: 36.8 (08 Sep 2020 07:19)  T(F): 98.2 (08 Sep 2020 07:19), Max: 98.2 (08 Sep 2020 07:19)  HR: --  BP: --  BP(mean): --  RR: 14 (08 Sep 2020 07:19) (14 - 14)  SpO2: 100% (08 Sep 2020 07:19) (100% - 100%)

## 2020-09-08 NOTE — PROGRESS NOTE BEHAVIORAL HEALTH - NSBHADDHXPSYCHFT_PSY_A_CORE
denies any in or outpatient treatment  No prior psychiatric hospitalization
denies any in or outpt treatment

## 2020-09-08 NOTE — PROGRESS NOTE BEHAVIORAL HEALTH - NSBHCHARTREVIEWVS_PSY_A_CORE FT
Vital Signs Last 24 Hrs  T(C): 36.8 (08 Sep 2020 07:19), Max: 37.2 (07 Sep 2020 08:16)  T(F): 98.2 (08 Sep 2020 07:19), Max: 99 (07 Sep 2020 08:16)  HR: --  BP: --118/67  BP(mean): --  RR: 14 (08 Sep 2020 07:19) (14 - 14)  SpO2: 100% (08 Sep 2020 07:19) (100% - 100%)

## 2020-09-08 NOTE — PROGRESS NOTE BEHAVIORAL HEALTH - NSBHADDHXPSYSOCFT_PSY_A_CORE
**Pt was served by the [facility]  on behalf of the [place] [address] of the [facility] case # [id number] , that a Temporary Extreme Risk Protection Order CPLR 6342 was GRANTED, and that a hearing needed to be held to determine if a final Extreme Risk Protection Order would be issued.  The Temporary Order was granted based on: " Respondent made statements that God was telling him to kill his brother Thiago. Handgun and ammunition were turned over to [employer]. " The Court finds upon this information that the respondent is a threat to himself and others.

## 2020-09-08 NOTE — PROGRESS NOTE BEHAVIORAL HEALTH - NSBHFUPIPCHARTREVFT_PSY_A_CORE
Pt is a 35 yr old  single male , no prior psych hospitalization hx, BIB family to [facility] , with homicidal ideation of wanting to kill his brother on the direction of GOD so that the brother  can be reborn as the patient's son. According to the Pt's father James , pt smoking cannabis daily, not sleeping, talking to self.  Pt apparently with poor sleep and response to internal stimuli  since 2019 according to the father but he was not brought in for treatment until he started to voice homicidal ideations.  **Pt was served by the [facility]  on behalf of the [place] [address] of the [facility] case # [id number] , that a Temporary Extreme Risk Protection Order CPLR 6342 was GRANTED, and that a hearing needed to be held to determine if a final Extreme Risk Protection Order would be issued.  The Temporary Order was granted based on: " Respondent made statements that God was telling him to kill his brother Thiago. Handgun and ammunition were turned over to [employer]. " The Court finds upon this information that the respondent is a threat to himself and others.  According to referral material from [facility] pt indicated that he had a traumatic break up with his girlfriend  of 5 years in Dec 2019 . Pt had since then had "been in contact with GOD and was writing testaments in the bible ." Claimed that he was exercising more, he was denying any homicidal ideation and claimed that he did not know why the family would say he said this." Claims he would get "GOD 's thoughts in his head seeing the holy spirit move his computer in the middle of the night.  When God tells me what I should do I cannot question  I have only to do it cause I don't want to go to Hell. He speaks to me as a thought not in words so no I hear no voices." "when I say these things are so I cannot lie for it is the truth "I did not want to reveal to the world who I am but I have 7 entities in me and I was ascended on August 16 and the reveal of my entities are more intense and fast when I use weed." "I know what I know is true as I have a second molar that it to receive a crown I will be Sebastien "I am the cornerstone of the [employer] I am  [place]. I am in charge and no one tells me what to do , no one can stop me."  "I am the reincarnate of [facility] [place] so I will take charge of the world, control it, and redesign it."  	Pt with grandiose delusions. Pt with no thought of needing treatment
Pt is a 35 yr old  single male , no prior psych hospitalization hx, BIB family to [facility] , with homicidal ideation of wanting to kill his brother on the direction of GOD so that the brother  can be reborn as the patient's son. According to the Pt's father James , pt smoking cannabis daily, not sleeping, talking to self.  Pt apparently with poor sleep and response to internal stimuli  since 2019 according to the father but he was not brought in for treatment until he started to voice homicidal ideations.  	Pt with grandiose delusions. Pt with no thought of needing treatment

## 2020-09-08 NOTE — PROGRESS NOTE BEHAVIORAL HEALTH - NSBHFUPADDHPIFT_PSY_A_CORE
SECTION 2  PROPOSED TREATMENT :    1. Course of Recommended treatment :  Please refer to Medication List   2. Reasonable alternatives:  Please refer to Medication List  3. Has patient been on proposed treatment:  Risperdal is prescribed for him   4. Has the patient been tried on other treatments : YES   5. Anticipated benefits of proposed treatment: Patient would be no longer delusional and therefore no longer homicidal or suicidal  6. Reasonable foreseeable adverse effects : sedation , dehydration,       a.  Patient at additional risk? : dystonia, dyskinesia, TD  7. Prognosis without treatment :  Extremely poor for patient and others
According to referral material from [facility] pt indicated that he had a traumatic break up with his girlfriend  of 5 years in Dec 2019 . Pt had since then had "been in contact with GOD and was writing testaments in the bible ." Claimed that he was exercising more, he was denying any homicidal ideation and claimed that he did not know why the family would say he said this." Claims he would get "GOD 's thoughts in his head seeing the holy spirit move his computer in the middle of the night.

## 2020-09-09 PROCEDURE — 99231 SBSQ HOSP IP/OBS SF/LOW 25: CPT

## 2020-09-09 RX ADMIN — RISPERIDONE 2 MILLIGRAM(S): 4 TABLET ORAL at 09:28

## 2020-09-09 RX ADMIN — RISPERIDONE 2 MILLIGRAM(S): 4 TABLET ORAL at 22:05

## 2020-09-09 NOTE — PROGRESS NOTE BEHAVIORAL HEALTH - NSBHCHARTREVIEWINVESTIGATE_PSY_A_CORE FT
Ventricular Rate 69 BPM    Atrial Rate 69 BPM    P-R Interval 150 ms    QRS Duration 90 ms    Q-T Interval 360 ms    QTC Calculation(Bezet) 385 ms    P Axis 80 degrees    R Axis 45 degrees    T Axis 62 degrees    Diagnosis Line Sinus rhythm with marked sinus arrhythmia  Otherwise normal ECG  No previous ECGs available  Confirmed by CHARITY NIELSEN, IDRIS ([id number]) on 9/3/2020 3:54:15 PM

## 2020-09-09 NOTE — PROGRESS NOTE BEHAVIORAL HEALTH - OTHER
pt is superficially cooperative, is guarded pt shouting that he cannot be told what to do "I am in charge" pt denies any hallucinations but appears preoccupied and messages as "thoughts" unkempt

## 2020-09-09 NOTE — PROGRESS NOTE BEHAVIORAL HEALTH - NSBHFUPINTERVALHXFT_PSY_A_CORE
Pt after refusal of medication has now taken risperdal m tablets  2mg yesterday night and this morning.   He claims he is not aware of any changes in the way he feels.  Will continue to monitor Pt after refusal of medication has now taken Risperdal m tablets  2mg yesterday night and this morning after he spoke with the mental hygiene .   He claims he is not aware of any changes in the way he feels. He claimed he is allowing the medications "to prove it changes nothing."  Pt still with grandiose , paranoid delusions.  Pt has through the  filed for being discharged from the hospital.

## 2020-09-09 NOTE — PROGRESS NOTE BEHAVIORAL HEALTH - SUMMARY
35 yr old single male living with family , no prior psych treatment in or outpt , is unemployed . He has no insight and believes he is not in need of treatment. Pt with daily use of cannabis and for the last year has been with a change in sleep pattern, increased delusions of grandiosity  and now according to family has voiced homicidal ideation towards his brother.  Pt himself denies any suicidal or homicidal ideation intent or plan    Hospitalization course:  9/9/2020 pt filed court papers for discharge, pt still delusional    9/8/2020 pt took night dose of risperdal   9/7/2020 pt continues with refusal of medication   9/4/2020 Pt with continued delusional thoughts , impulsivity   9/3/2020 Pt continued with refusing to take medication .  Pt with escalating aggressive behavior and was threatening to kill staff and peers , remains with paranoid and grandiose delusions.

## 2020-09-09 NOTE — PROGRESS NOTE BEHAVIORAL HEALTH - PERCEPTIONS
How Severe Is Your Skin Lesion?: mild Have Your Skin Lesions Been Treated?: not been treated Is This A New Presentation, Or A Follow-Up?: Skin Lesions Other/No abnormalities

## 2020-09-09 NOTE — PROGRESS NOTE BEHAVIORAL HEALTH - NSBHCHARTREVIEWVS_PSY_A_CORE FT
Vital Signs Last 24 Hrs  T(C): 37.1 (09 Sep 2020 07:43), Max: 37.1 (09 Sep 2020 07:43)  T(F): 98.8 (09 Sep 2020 07:43), Max: 98.8 (09 Sep 2020 07:43)  HR: --  BP: --  BP(mean): --  RR: 18 (09 Sep 2020 07:43) (18 - 18)  SpO2: 100% (09 Sep 2020 07:43) (100% - 100%)

## 2020-09-10 PROCEDURE — 99233 SBSQ HOSP IP/OBS HIGH 50: CPT

## 2020-09-10 RX ORDER — HALOPERIDOL DECANOATE 100 MG/ML
7.5 INJECTION INTRAMUSCULAR ONCE
Refills: 0 | Status: DISCONTINUED | OUTPATIENT
Start: 2020-09-10 | End: 2020-09-24

## 2020-09-10 RX ORDER — HALOPERIDOL DECANOATE 100 MG/ML
5 INJECTION INTRAMUSCULAR ONCE
Refills: 0 | Status: DISCONTINUED | OUTPATIENT
Start: 2020-09-10 | End: 2020-09-10

## 2020-09-10 RX ORDER — DIPHENHYDRAMINE HCL 50 MG
50 CAPSULE ORAL ONCE
Refills: 0 | Status: DISCONTINUED | OUTPATIENT
Start: 2020-09-10 | End: 2020-09-24

## 2020-09-10 RX ADMIN — Medication 2 MILLIGRAM(S): at 15:50

## 2020-09-10 RX ADMIN — Medication 50 MILLIGRAM(S): at 15:49

## 2020-09-10 RX ADMIN — RISPERIDONE 2 MILLIGRAM(S): 4 TABLET ORAL at 09:22

## 2020-09-10 RX ADMIN — HALOPERIDOL DECANOATE 5 MILLIGRAM(S): 100 INJECTION INTRAMUSCULAR at 15:50

## 2020-09-10 NOTE — PROGRESS NOTE BEHAVIORAL HEALTH - NSBHCHARTREVIEWVS_PSY_A_CORE FT
Vital Signs Last 24 Hrs  T(C): 36.7 (10 Sep 2020 07:46), Max: 36.7 (10 Sep 2020 07:46)  T(F): 98 (10 Sep 2020 07:46), Max: 98 (10 Sep 2020 07:46)  HR: --  BP: --  BP(mean): --  RR: 12 (10 Sep 2020 07:46) (12 - 12)  SpO2: 100% (10 Sep 2020 07:46) (100% - 100%)

## 2020-09-10 NOTE — PROGRESS NOTE BEHAVIORAL HEALTH - NSBHFUPINTERVALHXFT_PSY_A_CORE
Pt was irritable and claiming that she is definitely leaving the hospital tonight.  Pt was increasingly irritable and was threatening to escalate to physical confrontation as he indicated that "God tells me I am getting discharged from the hospital today and that will happen no matter what. "  Pt given prn medication as he packed his bags and insisted that he is leaving. . Pt was livid as he got off the phone with his father as he was insisting that he could not stay as God was telling him to leave the hospital.  Pt with prior need for 7.5mg as he was extremely aggressive and at risk for injury to others and to self. Pt was irritable and claiming that he is definitely leaving the hospital tonight.  Pt was increasingly irritable and was threatening to escalate to physical confrontation as he indicated that "God tells me I am getting discharged from the hospital today and that will happen no matter what. "  Pt given prn medication as he packed his bags and insisted that he is leaving. . Pt was livid as he got off the phone with his father as he was insisting that he could not stay as God was telling him to leave the hospital.  Pt with prior need for 7.5mg as he was extremely aggressive and at risk for injury to others and to self.

## 2020-09-11 PROCEDURE — 99232 SBSQ HOSP IP/OBS MODERATE 35: CPT

## 2020-09-11 RX ORDER — DIPHENHYDRAMINE HCL 50 MG
50 CAPSULE ORAL ONCE
Refills: 0 | Status: DISCONTINUED | OUTPATIENT
Start: 2020-09-11 | End: 2020-09-24

## 2020-09-11 RX ORDER — HALOPERIDOL DECANOATE 100 MG/ML
5 INJECTION INTRAMUSCULAR ONCE
Refills: 0 | Status: DISCONTINUED | OUTPATIENT
Start: 2020-09-11 | End: 2020-09-24

## 2020-09-11 RX ADMIN — RISPERIDONE 2 MILLIGRAM(S): 4 TABLET ORAL at 21:20

## 2020-09-11 RX ADMIN — RISPERIDONE 2 MILLIGRAM(S): 4 TABLET ORAL at 09:19

## 2020-09-11 NOTE — PROGRESS NOTE BEHAVIORAL HEALTH - SUMMARY
35 yr old single male living with family , no prior psych treatment in or outpt , is unemployed . He has no insight and believes he is not in need of treatment. Pt with daily use of cannabis and for the last year has been with a change in sleep pattern, increased delusions of grandiosity  and now according to family has voiced homicidal ideation towards his brother.  Pt himself denies any suicidal or homicidal ideation intent or plan    Hospitalization course:  9/9/2020 pt filed court papers for discharge, pt still delusional    9/8/2020 pt took night dose of risperdal   9/7/2020 pt continues with refusal of medication   9/4/2020 Pt with continued delusional thoughts , impulsivity   9/3/2020 Pt continued with refusing to take medication .  Pt with escalating aggressive behavior and was threatening to kill staff and peers , remains with paranoid and grandiose delusions. 35 yr old single male living with family , no prior psych treatment in or outpt , is unemployed . He has no insight and believes he is not in need of treatment. Pt with daily use of cannabis and for the last year has been with a change in sleep pattern, increased delusions of grandiosity  and now according to family has voiced homicidal ideation towards his brother.  Pt himself denies any suicidal or homicidal ideation intent or plan    Hospitalization course:  9/11/2020 pt refused the night dose of the Risperdal , remains irritable and delusional  .   9/10/2020  court is scheduled for 9/18/2020 9/9/2020 pt filed court papers for discharge, pt still delusional    9/8/2020 pt took night dose of risperdal   9/7/2020 pt continues with refusal of medication   9/4/2020 Pt with continued delusional thoughts , impulsivity   9/3/2020 Pt continued with refusing to take medication .  Pt with escalating aggressive behavior and was threatening to kill staff and peers , remains with paranoid and grandiose delusions.

## 2020-09-11 NOTE — PROGRESS NOTE BEHAVIORAL HEALTH - NSBHFUPINTERVALHXFT_PSY_A_CORE
Pt still with delusional thoughts .  He did not take the night dose of medication yesterday. . Pt is selective as to dosage of medication he is wiling to take. Court date is scheduled for next week. Pt still with delusional thoughts and is irritable as he is still in the hospital  .  He did not take the night dose of medication (risperdal) yesterday. . Pt is selective as to dosage of medication he is willing to take. Court date is scheduled for next week friday 9/18/2020 .

## 2020-09-11 NOTE — PROGRESS NOTE BEHAVIORAL HEALTH - PROBLEM SELECTOR PLAN 1
risperdal -pt took yesterday night and todays dose. risperdal -pt refused  yesterday night dose but took  today's morning dose.

## 2020-09-11 NOTE — PROGRESS NOTE BEHAVIORAL HEALTH - RISK ASSESSMENT
high risk  acute: hallucination , grandiose, bizarre and paranoid delusions, impulsive , aggressive behavior, threatening, continued refusal of medication     mitigating : denies any suicidal ideation intent or plan  chronic: lack of insight, use of cannabis high risk for aggression   acute: hallucination , grandiose, bizarre and paranoid delusions, impulsive , aggressive behavior,, little to no concern about own sefety as he is involved in aggressive behavior, threatening towards other people, continued refusal of medication     mitigating : denies any suicidal ideation intent or plan  chronic: lack of insight, use of cannabis

## 2020-09-11 NOTE — PROGRESS NOTE BEHAVIORAL HEALTH - NSBHCHARTREVIEWVS_PSY_A_CORE FT
Vital Signs Last 24 Hrs  T(C): 36.7 (10 Sep 2020 07:46), Max: 36.7 (10 Sep 2020 07:46)  T(F): 98 (10 Sep 2020 07:46), Max: 98 (10 Sep 2020 07:46)  HR: --  BP: --  BP(mean): --  RR: 12 (10 Sep 2020 07:46) (12 - 12)  SpO2: 100% (10 Sep 2020 07:46) (100% - 100%) Vital Signs Last 24 Hrs  T(C): 36.6 (11 Sep 2020 07:46), Max: 36.6 (11 Sep 2020 07:46)  T(F): 97.8 (11 Sep 2020 07:46), Max: 97.8 (11 Sep 2020 07:46)  HR: --  BP: --  BP(mean): --  RR: 18 (11 Sep 2020 07:46) (18 - 18)  SpO2: 100% (11 Sep 2020 07:46) (100% - 100%)

## 2020-09-12 PROCEDURE — 99231 SBSQ HOSP IP/OBS SF/LOW 25: CPT

## 2020-09-12 RX ORDER — RISPERIDONE 4 MG/1
3 TABLET ORAL AT BEDTIME
Refills: 0 | Status: DISCONTINUED | OUTPATIENT
Start: 2020-09-12 | End: 2020-09-19

## 2020-09-12 RX ORDER — RISPERIDONE 4 MG/1
2 TABLET ORAL DAILY
Refills: 0 | Status: DISCONTINUED | OUTPATIENT
Start: 2020-09-12 | End: 2020-09-19

## 2020-09-12 RX ADMIN — RISPERIDONE 2 MILLIGRAM(S): 4 TABLET ORAL at 10:44

## 2020-09-12 RX ADMIN — RISPERIDONE 3 MILLIGRAM(S): 4 TABLET ORAL at 20:40

## 2020-09-13 LAB
ALBUMIN SERPL ELPH-MCNC: 4.3 G/DL — SIGNIFICANT CHANGE UP (ref 3.3–5)
ALP SERPL-CCNC: 86 U/L — SIGNIFICANT CHANGE UP (ref 40–120)
ALT FLD-CCNC: 70 U/L — SIGNIFICANT CHANGE UP (ref 12–78)
ANION GAP SERPL CALC-SCNC: 6 MMOL/L — SIGNIFICANT CHANGE UP (ref 5–17)
AST SERPL-CCNC: 34 U/L — SIGNIFICANT CHANGE UP (ref 15–37)
BASOPHILS # BLD AUTO: 0.06 K/UL — SIGNIFICANT CHANGE UP (ref 0–0.2)
BASOPHILS NFR BLD AUTO: 0.6 % — SIGNIFICANT CHANGE UP (ref 0–2)
BILIRUB SERPL-MCNC: 0.5 MG/DL — SIGNIFICANT CHANGE UP (ref 0.2–1.2)
BUN SERPL-MCNC: 14 MG/DL — SIGNIFICANT CHANGE UP (ref 7–23)
CALCIUM SERPL-MCNC: 9.7 MG/DL — SIGNIFICANT CHANGE UP (ref 8.5–10.1)
CHLORIDE SERPL-SCNC: 108 MMOL/L — SIGNIFICANT CHANGE UP (ref 96–108)
CO2 SERPL-SCNC: 24 MMOL/L — SIGNIFICANT CHANGE UP (ref 22–31)
CREAT SERPL-MCNC: 0.98 MG/DL — SIGNIFICANT CHANGE UP (ref 0.5–1.3)
EOSINOPHIL # BLD AUTO: 0.11 K/UL — SIGNIFICANT CHANGE UP (ref 0–0.5)
EOSINOPHIL NFR BLD AUTO: 1.1 % — SIGNIFICANT CHANGE UP (ref 0–6)
GLUCOSE SERPL-MCNC: 89 MG/DL — SIGNIFICANT CHANGE UP (ref 70–99)
HCT VFR BLD CALC: 47.2 % — SIGNIFICANT CHANGE UP (ref 39–50)
HGB BLD-MCNC: 15.6 G/DL — SIGNIFICANT CHANGE UP (ref 13–17)
IMM GRANULOCYTES NFR BLD AUTO: 0.3 % — SIGNIFICANT CHANGE UP (ref 0–1.5)
LYMPHOCYTES # BLD AUTO: 2.83 K/UL — SIGNIFICANT CHANGE UP (ref 1–3.3)
LYMPHOCYTES # BLD AUTO: 27.5 % — SIGNIFICANT CHANGE UP (ref 13–44)
MCHC RBC-ENTMCNC: 29.9 PG — SIGNIFICANT CHANGE UP (ref 27–34)
MCHC RBC-ENTMCNC: 33.1 GM/DL — SIGNIFICANT CHANGE UP (ref 32–36)
MCV RBC AUTO: 90.4 FL — SIGNIFICANT CHANGE UP (ref 80–100)
MONOCYTES # BLD AUTO: 0.89 K/UL — SIGNIFICANT CHANGE UP (ref 0–0.9)
MONOCYTES NFR BLD AUTO: 8.7 % — SIGNIFICANT CHANGE UP (ref 2–14)
NEUTROPHILS # BLD AUTO: 6.36 K/UL — SIGNIFICANT CHANGE UP (ref 1.8–7.4)
NEUTROPHILS NFR BLD AUTO: 61.8 % — SIGNIFICANT CHANGE UP (ref 43–77)
PLATELET # BLD AUTO: 362 K/UL — SIGNIFICANT CHANGE UP (ref 150–400)
POTASSIUM SERPL-MCNC: 3.9 MMOL/L — SIGNIFICANT CHANGE UP (ref 3.5–5.3)
POTASSIUM SERPL-SCNC: 3.9 MMOL/L — SIGNIFICANT CHANGE UP (ref 3.5–5.3)
PROT SERPL-MCNC: 7.7 GM/DL — SIGNIFICANT CHANGE UP (ref 6–8.3)
RBC # BLD: 5.22 M/UL — SIGNIFICANT CHANGE UP (ref 4.2–5.8)
RBC # FLD: 13.2 % — SIGNIFICANT CHANGE UP (ref 10.3–14.5)
SODIUM SERPL-SCNC: 138 MMOL/L — SIGNIFICANT CHANGE UP (ref 135–145)
WBC # BLD: 10.28 K/UL — SIGNIFICANT CHANGE UP (ref 3.8–10.5)
WBC # FLD AUTO: 10.28 K/UL — SIGNIFICANT CHANGE UP (ref 3.8–10.5)

## 2020-09-13 PROCEDURE — 99232 SBSQ HOSP IP/OBS MODERATE 35: CPT

## 2020-09-13 RX ADMIN — RISPERIDONE 3 MILLIGRAM(S): 4 TABLET ORAL at 20:01

## 2020-09-13 RX ADMIN — RISPERIDONE 2 MILLIGRAM(S): 4 TABLET ORAL at 09:55

## 2020-09-13 NOTE — PROGRESS NOTE BEHAVIORAL HEALTH - NSBHFUPINTERVALHXFT_PSY_A_CORE
Pt continues with irritable mood and claiming "you are going against God's will and you will go to purgatory. "  Pt has indicated that "nothings changed " from the family session when he "revealed his true selves."

## 2020-09-13 NOTE — PROGRESS NOTE BEHAVIORAL HEALTH - RISK ASSESSMENT
high risk for aggression   acute: hallucination , grandiose, bizarre and paranoid delusions, impulsive , aggressive behavior,, little to no concern about own sefety as he is involved in aggressive behavior, threatening towards other people, continued refusal of medication     mitigating : denies any suicidal ideation intent or plan  chronic: lack of insight, use of cannabis

## 2020-09-13 NOTE — PROGRESS NOTE BEHAVIORAL HEALTH - SUMMARY
35 yr old single male living with family , no prior psych treatment in or outpt , is unemployed . He has no insight and believes he is not in need of treatment. Pt with daily use of cannabis and for the last year has been with a change in sleep pattern, increased delusions of grandiosity  and now according to family has voiced homicidal ideation towards his brother.  Pt himself denies any suicidal or homicidal ideation intent or plan    Hospitalization course:  9/11/2020 pt refused the night dose of the Risperdal , remains irritable and delusional  .   9/10/2020  court is scheduled for 9/18/2020 9/9/2020 pt filed court papers for discharge, pt still delusional    9/8/2020 pt took night dose of risperdal   9/7/2020 pt continues with refusal of medication   9/4/2020 Pt with continued delusional thoughts , impulsivity   9/3/2020 Pt continued with refusing to take medication .  Pt with escalating aggressive behavior and was threatening to kill staff and peers , remains with paranoid and grandiose delusions.

## 2020-09-13 NOTE — PROGRESS NOTE BEHAVIORAL HEALTH - NSBHCHARTREVIEWVS_PSY_A_CORE FT
Vital Signs Last 24 Hrs  T(C): 37.2 (13 Sep 2020 08:09), Max: 37.2 (13 Sep 2020 08:09)  T(F): 99 (13 Sep 2020 08:09), Max: 99 (13 Sep 2020 08:09)  HR: --  BP: --  BP(mean): --  RR: 12 (13 Sep 2020 08:09) (12 - 12)  SpO2: 99% (13 Sep 2020 08:09) (99% - 99%)

## 2020-09-14 PROCEDURE — 99232 SBSQ HOSP IP/OBS MODERATE 35: CPT

## 2020-09-14 RX ADMIN — RISPERIDONE 3 MILLIGRAM(S): 4 TABLET ORAL at 21:14

## 2020-09-14 RX ADMIN — RISPERIDONE 2 MILLIGRAM(S): 4 TABLET ORAL at 09:17

## 2020-09-14 NOTE — PROGRESS NOTE BEHAVIORAL HEALTH - OTHER
pt shouting that he cannot be told what to do "I am in charge" pt denies any hallucinations but appears preoccupied and messages as "thoughts" pt is superficially cooperative, is guarded unkempt

## 2020-09-14 NOTE — PROGRESS NOTE BEHAVIORAL HEALTH - SUMMARY
35 yr old single male living with family , no prior psych treatment in or outpt , is unemployed . He has no insight and believes he is not in need of treatment. Pt with daily use of cannabis and for the last year has been with a change in sleep pattern, increased delusions of grandiosity  and now according to family has voiced homicidal ideation towards his brother.  Pt himself denies any suicidal or homicidal ideation intent or plan    Hospitalization course:  9/13/2020 pt claiming nothing is changed    9/12/2020 pt is irritable and agitated tried to discuss about medication with him but too angry  9/11/2020 pt refused the night dose of the Risperdal , remains irritable and delusional  .   9/10/2020  court is scheduled for 9/18/2020 9/9/2020 pt filed court papers for discharge, pt still delusional    9/8/2020 pt took night dose of risperdal   9/7/2020 pt continues with refusal of medication   9/4/2020 Pt with continued delusional thoughts , impulsivity   9/3/2020 Pt continued with refusing to take medication .  Pt with escalating aggressive behavior and was threatening to kill staff and peers , remains with paranoid and grandiose delusions.

## 2020-09-14 NOTE — PROGRESS NOTE BEHAVIORAL HEALTH - NSBHFUPINTERVALCCFT_PSY_A_CORE
"It's pointless to force me to take medication , nothing has changed except you will go to purgatory for going against God ."

## 2020-09-14 NOTE — PROGRESS NOTE BEHAVIORAL HEALTH - RISK ASSESSMENT
high risk for aggression   acute: hallucination , grandiose, bizarre and paranoid delusions, impulsive , aggressive behavior,, little to no concern about own safety as he is involved in aggressive behavior, threatening towards other people,     mitigating : denies any suicidal ideation intent or plan  chronic: lack of insight, use of cannabis

## 2020-09-14 NOTE — PROGRESS NOTE BEHAVIORAL HEALTH - PRIMARY DX
Patrick Olson was seen at the [facility] on 7/20/18 for hearing aid services.  She dropped off her hearing aids to be cleaned prior to an appointment elsewhere in the building.  Both hearing aids were cleaned and  filters and domes (medium open) were replaced.  A new hannah lock was also placed on the right device (she does not utilize a hannah lock on the left device).  Both hearing aids were found to be working normally.  Patrick picked them up after her appointment and will return to the clinic as needed.   Bipolar affective

## 2020-09-14 NOTE — PROGRESS NOTE BEHAVIORAL HEALTH - NSBHFUPINTERVALHXFT_PSY_A_CORE
Pt still easily irritable, still with grandiose paranoid delusion.  Pt still believing that he is not needing medication as he is not with any mental illness and that he is a son of GOD. Pt still easily irritable, still with grandiose paranoid delusion.  Pt still believing that he is not needing medication as he is not with any mental illness, and that he is a son of GOD. As he is saying nothing has changed he is still with homicidal ideation .

## 2020-09-14 NOTE — PROGRESS NOTE BEHAVIORAL HEALTH - NSBHCHARTREVIEWVS_PSY_A_CORE FT
Vital Signs Last 24 Hrs  T(C): 37 (14 Sep 2020 08:17), Max: 37 (14 Sep 2020 08:17)  T(F): 98.6 (14 Sep 2020 08:17), Max: 98.6 (14 Sep 2020 08:17)  HR: --  BP: --  BP(mean): --  RR: 12 (14 Sep 2020 08:17) (12 - 12)  SpO2: 100% (14 Sep 2020 08:17) (100% - 100%)

## 2020-09-15 PROCEDURE — 99232 SBSQ HOSP IP/OBS MODERATE 35: CPT

## 2020-09-15 RX ADMIN — RISPERIDONE 3 MILLIGRAM(S): 4 TABLET ORAL at 20:34

## 2020-09-15 RX ADMIN — RISPERIDONE 2 MILLIGRAM(S): 4 TABLET ORAL at 09:35

## 2020-09-15 NOTE — PROGRESS NOTE BEHAVIORAL HEALTH - NSBHCHARTREVIEWVS_PSY_A_CORE FT
Vital Signs Last 24 Hrs  T(C): 36.7 (15 Sep 2020 07:40), Max: 36.7 (15 Sep 2020 07:40)  T(F): 98.1 (15 Sep 2020 07:40), Max: 98.1 (15 Sep 2020 07:40)  HR: --  BP: --  BP(mean): --  RR: 18 (15 Sep 2020 07:40) (18 - 18)  SpO2: 100% (15 Sep 2020 07:40) (100% - 100%)

## 2020-09-15 NOTE — PROGRESS NOTE BEHAVIORAL HEALTH - NSBHFUPINTERVALHXFT_PSY_A_CORE
Pt indicating that hs is still with grandiose and bizarre delusions. Tried to talk with him about medication but the mare mention of the topic gets him easily angered, he starts pacing  and yelling at  me about my going to purgatory as I am going against God's word. He remains impulsive , low frustration tolerance and easily  escalating to an angry tirade. Pt indicating that hs is still with grandiose and bizarre delusions. Tried to talk with him about medication but the mere mention of the topic gets him easily angered, he starts pacing  and yelling at  me about my going to purgatory as I am going against God's word. He remains impulsive , low frustration tolerance and easily  escalating to an angry tirade.

## 2020-09-15 NOTE — PROGRESS NOTE BEHAVIORAL HEALTH - SUMMARY
35 yr old single male living with family , no prior psych treatment in or outpt , is unemployed . He has no insight and believes he is not in need of treatment. Pt with daily use of cannabis and for the last year has been with a change in sleep pattern, increased delusions of grandiosity  and now according to family has voiced homicidal ideation towards his brother.  Pt himself denies any suicidal or homicidal ideation intent or plan    Hospitalization course:  9/15/2020 Pt served with papers by the   9/14/2020 Pt with administrative meeting tomorrow   9/13/2020 pt claiming nothing is changed    9/12/2020 pt is irritable and agitated tried to discuss about medication with him but too angry  9/11/2020 pt refused the night dose of the Risperdal , remains irritable and delusional  .   9/10/2020  court is scheduled for 9/18/2020 9/9/2020 pt filed court papers for discharge, pt still delusional    9/8/2020 pt took night dose of risperdal   9/7/2020 pt continues with refusal of medication   9/4/2020 Pt with continued delusional thoughts , impulsivity   9/3/2020 Pt continued with refusing to take medication .  Pt with escalating aggressive behavior and was threatening to kill staff and peers , remains with paranoid and grandiose delusions.

## 2020-09-16 PROCEDURE — 93010 ELECTROCARDIOGRAM REPORT: CPT

## 2020-09-16 PROCEDURE — 99232 SBSQ HOSP IP/OBS MODERATE 35: CPT

## 2020-09-16 RX ORDER — DIVALPROEX SODIUM 500 MG/1
250 TABLET, DELAYED RELEASE ORAL
Refills: 0 | Status: DISCONTINUED | OUTPATIENT
Start: 2020-09-16 | End: 2020-09-24

## 2020-09-16 RX ADMIN — RISPERIDONE 2 MILLIGRAM(S): 4 TABLET ORAL at 09:01

## 2020-09-16 RX ADMIN — DIVALPROEX SODIUM 250 MILLIGRAM(S): 500 TABLET, DELAYED RELEASE ORAL at 20:31

## 2020-09-16 RX ADMIN — RISPERIDONE 3 MILLIGRAM(S): 4 TABLET ORAL at 20:31

## 2020-09-16 NOTE — PROGRESS NOTE BEHAVIORAL HEALTH - NSBHFUPSUICINTERVALFT_PSY_A_CORE
pt now claiming he has no thoughts of suicide.
not necessary as a planned issue but a potential as a result of his violence towards other people that he may be killed .Pt more concerned about his going "tp Hell" for not following the directives he is given.

## 2020-09-16 NOTE — PROGRESS NOTE BEHAVIORAL HEALTH - NSBHCHARTREVIEWVS_PSY_A_CORE FT
`Vital Signs Last 24 Hrs  T(C): 37.4 (16 Sep 2020 07:58), Max: 37.4 (16 Sep 2020 07:58)  T(F): 99.3 (16 Sep 2020 07:58), Max: 99.3 (16 Sep 2020 07:58)  HR: --  BP: --  BP(mean): --  RR: 14 (16 Sep 2020 07:58) (14 - 14)  SpO2: 100% (16 Sep 2020 07:58) (100% - 100%)

## 2020-09-16 NOTE — PROGRESS NOTE BEHAVIORAL HEALTH - NSBHFUPINTERVALHXFT_PSY_A_CORE
Pt now presenting a letter claiming that he is doing well.  Pt claiming that "I'm starting to think the weed I was smoking before I got here was affecting my thinking. ". The CSW , RN and myself are all hopeful but also skeptical  of his fast recovery as he was as of friday still believing nothing was different .   He is still not willing to be on a long acting injectable neuroleptic and he claimed he would be willing to continue with the oral medication.  Pt was displaying unusual behavior of repeatedly spitting into a trash can and there was a wonder if he had been trying to spit up the risperdal mtabs.  Added depakote to help with decreasing mood and affect lability. Pt claimed he would consider the option of rescinding his  request to court  for discharge and allow continued adjustment of his medication treatment for additional week if the hospital's court papers can be adjourned   for a week if he rescinds his petition to the court.    Pt claims he would consider this . He was told also to attend groups to allow the treatment team to observe that he is able to control his behavior and that he was really improving.  Pt seemed evasive and would claim that "I no longer believe what I believed " but stops short of saying in more detail that he is not Bautista Dias, not [facility] , only "I no longer believe what I believed "  and that he is a "pious man , I believe in God ." Need to see if still court on friday 9/18
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
## 2020-09-16 NOTE — PROGRESS NOTE BEHAVIORAL HEALTH - OTHER
pt is superficially cooperative, is guarded pt shouting that he cannot be told what to do "I am in charge" pt claims he "no longer believes what I was saying I believed pt denies any hallucinations but appears preoccupied and messages as "thoughts"
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
## 2020-09-16 NOTE — PROGRESS NOTE BEHAVIORAL HEALTH - SUMMARY
35 yr old single male living with family , no prior psych treatment in or outpt , is unemployed . He has no insight and believes he is not in need of treatment. Pt with daily use of cannabis and for the last year has been with a change in sleep pattern, increased delusions of grandiosity  and now according to family has voiced homicidal ideation towards his brother.  Pt himself denies any suicidal or homicidal ideation intent or plan    Hospitalization course:  9/16/2020 pt submits another letter. but strange behavior of repeatedly spitting into trash can in between talking , still impulsive , added depakote   9/15/2020 Pt served with papers by the   9/14/2020 Pt with administrative meeting tomorrow   9/13/2020 pt claiming nothing is changed    9/12/2020 pt is irritable and agitated tried to discuss about medication with him but too angry  9/11/2020 pt refused the night dose of the Risperdal , remains irritable and delusional  .   9/10/2020  court is scheduled for 9/18/2020 9/9/2020 pt filed court papers for discharge, pt still delusional    9/8/2020 pt took night dose of risperdal   9/7/2020 pt continues with refusal of medication   9/4/2020 Pt with continued delusional thoughts , impulsivity   9/3/2020 Pt continued with refusing to take medication .  Pt with escalating aggressive behavior and was threatening to kill staff and peers , remains with paranoid and grandiose delusions.

## 2020-09-17 PROCEDURE — 99232 SBSQ HOSP IP/OBS MODERATE 35: CPT

## 2020-09-17 RX ADMIN — RISPERIDONE 3 MILLIGRAM(S): 4 TABLET ORAL at 20:17

## 2020-09-17 NOTE — PROGRESS NOTE BEHAVIORAL HEALTH - NSBHFUPINTERVALHXFT_PSY_A_CORE
Pt claiming that he is feeling well and was able to ask his  to postpone his request for discharge and willing to continue with risperdal .  He even tried one dose of the Depakote but declined to continue with it claiming "gave me a head ache.   Pt attended group but according to the therapist was superficial and did not divulge much information.  He declined the need for decanoate medication. .  Pt's father is also asking that the pt not be on any long acting injectable medication.

## 2020-09-17 NOTE — PROGRESS NOTE BEHAVIORAL HEALTH - OTHER
pt is superficially cooperative, is guarded pt denies any hallucinations but appears preoccupied and messages as "thoughts"

## 2020-09-17 NOTE — PROGRESS NOTE BEHAVIORAL HEALTH - RISK ASSESSMENT
high risk for aggression   acute: denies hallucination , grandiose, bizarre and paranoid delusions, impulsive , aggressive behavior,, little to no concern about own safety as he is involved in aggressive behavior, threatening towards other people,     mitigating : denies any suicidal ideation intent or plan  chronic: lack of insight, use of cannabis

## 2020-09-18 ENCOUNTER — EMERGENCY (EMERGENCY)
Facility: HOSPITAL | Age: 35
LOS: 0 days | Discharge: HOME | End: 2020-09-19
Attending: EMERGENCY MEDICINE
Payer: MEDICAID

## 2020-09-18 VITALS
WEIGHT: 156.97 LBS | RESPIRATION RATE: 17 BRPM | HEART RATE: 66 BPM | DIASTOLIC BLOOD PRESSURE: 54 MMHG | SYSTOLIC BLOOD PRESSURE: 88 MMHG | HEIGHT: 68 IN | TEMPERATURE: 98 F

## 2020-09-18 DIAGNOSIS — Z88.0 ALLERGY STATUS TO PENICILLIN: ICD-10-CM

## 2020-09-18 DIAGNOSIS — R55 SYNCOPE AND COLLAPSE: ICD-10-CM

## 2020-09-18 DIAGNOSIS — Z88.6 ALLERGY STATUS TO ANALGESIC AGENT: ICD-10-CM

## 2020-09-18 DIAGNOSIS — R11.2 NAUSEA WITH VOMITING, UNSPECIFIED: ICD-10-CM

## 2020-09-18 DIAGNOSIS — R42 DIZZINESS AND GIDDINESS: ICD-10-CM

## 2020-09-18 DIAGNOSIS — E86.0 DEHYDRATION: ICD-10-CM

## 2020-09-18 LAB
ANION GAP SERPL CALC-SCNC: 3 MMOL/L — LOW (ref 5–17)
BASOPHILS # BLD AUTO: 0.05 K/UL — SIGNIFICANT CHANGE UP (ref 0–0.2)
BASOPHILS NFR BLD AUTO: 0.6 % — SIGNIFICANT CHANGE UP (ref 0–2)
BUN SERPL-MCNC: 21 MG/DL — SIGNIFICANT CHANGE UP (ref 7–23)
CALCIUM SERPL-MCNC: 8.6 MG/DL — SIGNIFICANT CHANGE UP (ref 8.5–10.1)
CHLORIDE SERPL-SCNC: 108 MMOL/L — SIGNIFICANT CHANGE UP (ref 96–108)
CO2 SERPL-SCNC: 29 MMOL/L — SIGNIFICANT CHANGE UP (ref 22–31)
CREAT SERPL-MCNC: 0.91 MG/DL — SIGNIFICANT CHANGE UP (ref 0.5–1.3)
EOSINOPHIL # BLD AUTO: 0.11 K/UL — SIGNIFICANT CHANGE UP (ref 0–0.5)
EOSINOPHIL NFR BLD AUTO: 1.3 % — SIGNIFICANT CHANGE UP (ref 0–6)
GLUCOSE SERPL-MCNC: 107 MG/DL — HIGH (ref 70–99)
HCT VFR BLD CALC: 39 % — SIGNIFICANT CHANGE UP (ref 39–50)
HGB BLD-MCNC: 13 G/DL — SIGNIFICANT CHANGE UP (ref 13–17)
IMM GRANULOCYTES NFR BLD AUTO: 0.3 % — SIGNIFICANT CHANGE UP (ref 0–1.5)
LYMPHOCYTES # BLD AUTO: 2.98 K/UL — SIGNIFICANT CHANGE UP (ref 1–3.3)
LYMPHOCYTES # BLD AUTO: 34.5 % — SIGNIFICANT CHANGE UP (ref 13–44)
MCHC RBC-ENTMCNC: 30.4 PG — SIGNIFICANT CHANGE UP (ref 27–34)
MCHC RBC-ENTMCNC: 33.3 GM/DL — SIGNIFICANT CHANGE UP (ref 32–36)
MCV RBC AUTO: 91.3 FL — SIGNIFICANT CHANGE UP (ref 80–100)
MONOCYTES # BLD AUTO: 0.8 K/UL — SIGNIFICANT CHANGE UP (ref 0–0.9)
MONOCYTES NFR BLD AUTO: 9.3 % — SIGNIFICANT CHANGE UP (ref 2–14)
NEUTROPHILS # BLD AUTO: 4.66 K/UL — SIGNIFICANT CHANGE UP (ref 1.8–7.4)
NEUTROPHILS NFR BLD AUTO: 54 % — SIGNIFICANT CHANGE UP (ref 43–77)
PLATELET # BLD AUTO: 265 K/UL — SIGNIFICANT CHANGE UP (ref 150–400)
POTASSIUM SERPL-MCNC: 3.8 MMOL/L — SIGNIFICANT CHANGE UP (ref 3.5–5.3)
POTASSIUM SERPL-SCNC: 3.8 MMOL/L — SIGNIFICANT CHANGE UP (ref 3.5–5.3)
RBC # BLD: 4.27 M/UL — SIGNIFICANT CHANGE UP (ref 4.2–5.8)
RBC # FLD: 13.2 % — SIGNIFICANT CHANGE UP (ref 10.3–14.5)
SODIUM SERPL-SCNC: 140 MMOL/L — SIGNIFICANT CHANGE UP (ref 135–145)
TROPONIN I SERPL-MCNC: <0.015 NG/ML — SIGNIFICANT CHANGE UP (ref 0.01–0.04)
WBC # BLD: 8.63 K/UL — SIGNIFICANT CHANGE UP (ref 3.8–10.5)
WBC # FLD AUTO: 8.63 K/UL — SIGNIFICANT CHANGE UP (ref 3.8–10.5)

## 2020-09-18 PROCEDURE — 84484 ASSAY OF TROPONIN QUANT: CPT

## 2020-09-18 PROCEDURE — 99285 EMERGENCY DEPT VISIT HI MDM: CPT

## 2020-09-18 PROCEDURE — 99283 EMERGENCY DEPT VISIT LOW MDM: CPT | Mod: 25

## 2020-09-18 PROCEDURE — 93010 ELECTROCARDIOGRAM REPORT: CPT

## 2020-09-18 PROCEDURE — 96360 HYDRATION IV INFUSION INIT: CPT

## 2020-09-18 PROCEDURE — 80048 BASIC METABOLIC PNL TOTAL CA: CPT

## 2020-09-18 PROCEDURE — 83735 ASSAY OF MAGNESIUM: CPT

## 2020-09-18 PROCEDURE — 36415 COLL VENOUS BLD VENIPUNCTURE: CPT

## 2020-09-18 PROCEDURE — 85025 COMPLETE CBC W/AUTO DIFF WBC: CPT

## 2020-09-18 PROCEDURE — 96361 HYDRATE IV INFUSION ADD-ON: CPT

## 2020-09-18 PROCEDURE — 93005 ELECTROCARDIOGRAM TRACING: CPT

## 2020-09-18 PROCEDURE — 99232 SBSQ HOSP IP/OBS MODERATE 35: CPT

## 2020-09-18 RX ORDER — SODIUM CHLORIDE 9 MG/ML
1000 INJECTION INTRAMUSCULAR; INTRAVENOUS; SUBCUTANEOUS ONCE
Refills: 0 | Status: COMPLETED | OUTPATIENT
Start: 2020-09-18 | End: 2020-09-18

## 2020-09-18 RX ADMIN — SODIUM CHLORIDE 1000 MILLILITER(S): 9 INJECTION INTRAMUSCULAR; INTRAVENOUS; SUBCUTANEOUS at 23:28

## 2020-09-18 RX ADMIN — RISPERIDONE 2 MILLIGRAM(S): 4 TABLET ORAL at 09:13

## 2020-09-18 RX ADMIN — RISPERIDONE 3 MILLIGRAM(S): 4 TABLET ORAL at 20:33

## 2020-09-18 RX ADMIN — SODIUM CHLORIDE 2000 MILLILITER(S): 9 INJECTION INTRAMUSCULAR; INTRAVENOUS; SUBCUTANEOUS at 22:38

## 2020-09-18 NOTE — PROGRESS NOTE BEHAVIORAL HEALTH - SUMMARY
35 yr old single male living with family , no prior psych treatment in or outpt , is unemployed . He has no insight and believes he is not in need of treatment. Pt with daily use of cannabis and for the last year has been with a change in sleep pattern, increased delusions of grandiosity  and now according to family has voiced homicidal ideation towards his brother.  Pt himself denies any suicidal or homicidal ideation intent or plan    Hospitalization course:  9/17/2020 Pt refusing the depakote and claiming a headache from the medication , no behavioral issues  9/16/2020 pt submits another letter. but strange behavior of repeatedly spitting into trash can in between talking , still impulsive , added depakote   9/15/2020 Pt served with papers by the   9/14/2020 Pt with administrative meeting tomorrow   9/13/2020 pt claiming nothing is changed    9/12/2020 pt is irritable and agitated tried to discuss about medication with him but too angry  9/11/2020 pt refused the night dose of the Risperdal , remains irritable and delusional  .   9/10/2020  court is scheduled for 9/18/2020 9/9/2020 pt filed court papers for discharge, pt still delusional    9/8/2020 pt took night dose of risperdal   9/7/2020 pt continues with refusal of medication   9/4/2020 Pt with continued delusional thoughts , impulsivity   9/3/2020 Pt continued with refusing to take medication .  Pt with escalating aggressive behavior and was threatening to kill staff and peers , remains with paranoid and grandiose delusions.

## 2020-09-18 NOTE — ED PROVIDER NOTE - CLINICAL SUMMARY MEDICAL DECISION MAKING FREE TEXT BOX
Presyncopal episode likely due to orthostatic hypotension/medication side effect. Will give pt IV fluids, EKG, labs, reassess. If BP improves, can be cleared to return to [facility].

## 2020-09-18 NOTE — ED ADULT NURSE NOTE - NSIMPLEMENTINTERV_GEN_ALL_ED
Implemented All Fall with Harm Risk Interventions:  [place] to call system. Call bell, personal items and telephone within reach. Instruct patient to call for assistance. Room bathroom lighting operational. Non-slip footwear when patient is off stretcher. Physically safe environment: no spills, clutter or unnecessary equipment. Stretcher in lowest position, wheels locked, appropriate side rails in place. Provide visual cue, wrist band, yellow gown, etc. Monitor gait and stability. Monitor for mental status changes and reorient to person, place, and time. Review medications for side effects contributing to fall risk. Reinforce activity limits and safety measures with patient and family. Provide visual clues: red socks.

## 2020-09-18 NOTE — ED ADULT NURSE NOTE - OBJECTIVE STATEMENT
pt presents to ED from [facility]. rapid response called by [facility] after pt became nauseous, lightheaded, hypotensive. 1 episode emesis. pt was sitting in chair, denies fall, injury. last medication given was risperdal at about 2100. denies lightheaded, dizziness, nausea at this time.

## 2020-09-18 NOTE — PROGRESS NOTE BEHAVIORAL HEALTH - NSBHCHARTREVIEWVS_PSY_A_CORE FT
Vital Signs Last 24 Hrs  T(C): 36.9 (18 Sep 2020 08:45), Max: 36.9 (18 Sep 2020 08:45)  T(F): 98.4 (18 Sep 2020 08:45), Max: 98.4 (18 Sep 2020 08:45)  HR: --  BP: --  BP(mean): --  RR: 12 (18 Sep 2020 08:45) (12 - 12)  SpO2: 98% (18 Sep 2020 08:45) (98% - 98%)

## 2020-09-18 NOTE — ED PROVIDER NOTE - OBJECTIVE STATEMENT
36 y/o male with no significant PMHx presents to the ED for evaluation. Pt was being hospitalized on [facility] for psychotic episode. Pt was given his Risperdal around 9:30pm, was sitting in a chair and became lightheaded. Pt found to be hypotensive and a rapid response was called. Pt vomited once. No other complaints at this time.

## 2020-09-18 NOTE — ED PROVIDER NOTE - PATIENT PORTAL LINK FT
You can access the FollowMyHealth Patient Portal offered by [facility] by registering at the following website: [email or web address]. By joining [employer] [employer]’s FollowMyHealth portal, you will also be able to view your health information using other applications (apps) compatible with our system.

## 2020-09-18 NOTE — PROGRESS NOTE BEHAVIORAL HEALTH - NSBHFUPINTERVALHXFT_PSY_A_CORE
Pt with no new issues .  Pt claiming that he is feeling well and with no suicidal or homicidal ideation intent or plan .  Pt is more isolative and aside of meals and short periods of interaction with select peers he is staying in his room. He was with no continuation of the odd behavior of spitting into the trash can as he was yesterday.  He remains superficially cooperative,

## 2020-09-18 NOTE — ED ADULT TRIAGE NOTE - CHIEF COMPLAINT QUOTE
Patient from [facility] rapid response activated for near syncopal event and vomiting.  BP during rapid was in the 70's systolic.  Only med today was risperidal 3mg at 2033.

## 2020-09-19 VITALS
OXYGEN SATURATION: 99 % | DIASTOLIC BLOOD PRESSURE: 61 MMHG | HEART RATE: 71 BPM | SYSTOLIC BLOOD PRESSURE: 96 MMHG | RESPIRATION RATE: 18 BRPM | TEMPERATURE: 98 F

## 2020-09-19 LAB — MAGNESIUM SERPL-MCNC: 2.1 MG/DL — SIGNIFICANT CHANGE UP (ref 1.6–2.6)

## 2020-09-19 PROCEDURE — 93010 ELECTROCARDIOGRAM REPORT: CPT

## 2020-09-19 PROCEDURE — 99232 SBSQ HOSP IP/OBS MODERATE 35: CPT

## 2020-09-19 RX ORDER — SODIUM CHLORIDE 9 MG/ML
1000 INJECTION INTRAMUSCULAR; INTRAVENOUS; SUBCUTANEOUS ONCE
Refills: 0 | Status: COMPLETED | OUTPATIENT
Start: 2020-09-19 | End: 2020-09-19

## 2020-09-19 RX ORDER — RISPERIDONE 4 MG/1
2 TABLET ORAL
Refills: 0 | Status: DISCONTINUED | OUTPATIENT
Start: 2020-09-19 | End: 2020-09-25

## 2020-09-19 RX ADMIN — RISPERIDONE 2 MILLIGRAM(S): 4 TABLET ORAL at 11:40

## 2020-09-19 RX ADMIN — SODIUM CHLORIDE 1000 MILLILITER(S): 9 INJECTION INTRAMUSCULAR; INTRAVENOUS; SUBCUTANEOUS at 00:45

## 2020-09-19 NOTE — PROGRESS NOTE BEHAVIORAL HEALTH - NSBHFUPINTERVALCCFT_PSY_A_CORE
"MY blood pressure was low"  Per RN pt w sin ED yesterday for weakness.  Per RN father called and yelled at staff NOT to give opt his meds.

## 2020-09-19 NOTE — PROGRESS NOTE BEHAVIORAL HEALTH - NSBHFUPINTERVALHXFT_PSY_A_CORE
Pt with no new issues .    Pt is visible on the unit talkative makes eye contact States that  he felt dizzy and was sent to ED last night. NO note from ED, labs wnl and he was not orthostatic. BP is low., but not orthostatic.   Pt denies depressed mood , anxiety, sleep and appetite problems denies SI, HI, ah, vh.

## 2020-09-19 NOTE — PROGRESS NOTE BEHAVIORAL HEALTH - NSBHCHARTREVIEWINVESTIGATE_PSY_A_CORE FT
Ventricular Rate 61 BPM    Atrial Rate 61 BPM    P-R Interval 172 ms    QRS Duration 82 ms    Q-T Interval 376 ms    QTC Calculation(Bazett) 378 ms    P Axis 72 degrees    R Axis 55 degrees    T Axis 55 degrees    Diagnosis Line Normal sinus rhythm with sinus arrhythmia  Normal ECG

## 2020-09-19 NOTE — PROGRESS NOTE BEHAVIORAL HEALTH - SUMMARY
35 yr old single male living with family , no prior psych treatment in or outpt , is unemployed . He has no insight and believes he is not in need of treatment. Pt with daily use of cannabis and for the last year has been with a change in sleep pattern, increased delusions of grandiosity  and now according to family has voiced homicidal ideation towards his brother.  Pt himself denies any suicidal or homicidal ideation intent or plan    Hospitalization course:  9/19/2020: pt fainted last night, was in ED, hydrated,. Now no complaints, father si requesting meds to be held. Ordered hospitalist f/u s/p syncope like episode.   9/17/2020 Pt refusing the depakote and claiming a headache from the medication , no behavioral issues  9/16/2020 pt submits another letter. but strange behavior of repeatedly spitting into trash can in between talking , still impulsive , added depakote   9/15/2020 Pt served with papers by the   9/14/2020 Pt with administrative meeting tomorrow   9/13/2020 pt claiming nothing is changed    9/12/2020 pt is irritable and agitated tried to discuss about medication with him but too angry  9/11/2020 pt refused the night dose of the Risperdal , remains irritable and delusional  .   9/10/2020  court is scheduled for 9/18/2020 9/9/2020 pt filed court papers for discharge, pt still delusional    9/8/2020 pt took night dose of risperdal   9/7/2020 pt continues with refusal of medication   9/4/2020 Pt with continued delusional thoughts , impulsivity   9/3/2020 Pt continued with refusing to take medication .  Pt with escalating aggressive behavior and was threatening to kill staff and peers , remains with paranoid and grandiose delusions. 35 yr old single male living with family , no prior psych treatment in or outpt , is unemployed . He has no insight and believes he is not in need of treatment. Pt with daily use of cannabis and for the last year has been with a change in sleep pattern, increased delusions of grandiosity  and now according to family has voiced homicidal ideation towards his brother.  Pt himself denies any suicidal or homicidal ideation intent or plan    Hospitalization course:  9/19/2020: pt fainted last night, was in ED, hydrated,. Now no complaints, father si requesting meds to be held. Ordered hospitalist f/u s/p syncope like episode. LOWER RISPERDAL TO 2 MG PO 2 X A DAY   9/17/2020 Pt refusing the depakote and claiming a headache from the medication , no behavioral issues  9/16/2020 pt submits another letter. but strange behavior of repeatedly spitting into trash can in between talking , still impulsive , added depakote   9/15/2020 Pt served with papers by the   9/14/2020 Pt with administrative meeting tomorrow   9/13/2020 pt claiming nothing is changed    9/12/2020 pt is irritable and agitated tried to discuss about medication with him but too angry  9/11/2020 pt refused the night dose of the Risperdal , remains irritable and delusional  .   9/10/2020  court is scheduled for 9/18/2020 9/9/2020 pt filed court papers for discharge, pt still delusional    9/8/2020 pt took night dose of risperdal   9/7/2020 pt continues with refusal of medication   9/4/2020 Pt with continued delusional thoughts , impulsivity   9/3/2020 Pt continued with refusing to take medication .  Pt with escalating aggressive behavior and was threatening to kill staff and peers , remains with paranoid and grandiose delusions.

## 2020-09-19 NOTE — ED ADULT NURSE REASSESSMENT NOTE - NS ED NURSE REASSESS COMMENT FT1
Report given to [facility]. pt provided sandwich, juice. ambulated to bathroom with steady gait, accompanied by 1:1. VSS. awaiting transport.

## 2020-09-19 NOTE — PROGRESS NOTE BEHAVIORAL HEALTH - NSBHCHARTREVIEWVS_PSY_A_CORE FT
Vital Signs Last 24 Hrs  T(C): 36.6 (19 Sep 2020 08:29), Max: 36.7 (18 Sep 2020 22:09)  T(F): 97.9 (19 Sep 2020 08:29), Max: 98 (18 Sep 2020 22:09)  HR: 69 (19 Sep 2020 02:51) (60 - 71)  BP: 106/61 (19 Sep 2020 02:51) (88/54 - 106/61)  BP(mean): 72 (19 Sep 2020 02:25) (68 - 73)  RR: 18 (19 Sep 2020 08:29) (14 - 18)  SpO2: 100% (19 Sep 2020 08:29) (98% - 100%)

## 2020-09-20 LAB
D DIMER BLD IA.RAPID-MCNC: 231 NG/ML DDU — HIGH
TROPONIN I SERPL-MCNC: <0.015 NG/ML — SIGNIFICANT CHANGE UP (ref 0.01–0.04)

## 2020-09-20 PROCEDURE — 99233 SBSQ HOSP IP/OBS HIGH 50: CPT

## 2020-09-20 RX ADMIN — RISPERIDONE 2 MILLIGRAM(S): 4 TABLET ORAL at 20:11

## 2020-09-20 RX ADMIN — RISPERIDONE 2 MILLIGRAM(S): 4 TABLET ORAL at 09:49

## 2020-09-20 NOTE — CONSULT NOTE ADULT - SUBJECTIVE AND OBJECTIVE BOX
HPI:  36 y/o male with no significant medical history presented as a direct transfer from [facility] where he was brought in by his family for psychosis. Per records, pt verbalizes homicidal ideation of wanting to kill his brother on the direction of GOD so that the brother can be reborn as the patient's son. According to the Pt's father James , pt smoking cannabis daily, not sleeping, talking for the past year. Today, writer tried to interview pt however history is limited because pt is guarded and is a poor historian.     all of history obtained from nursing and physician charts. PT lacks insight. Reconsulted for questionable syncopal like episode for which patient was sent down to ED for workup however per charts, patient was not found to have any acute issues and sent back to . EKG does not demonstrate dynamic changes and has a normal QT on last two reads. Troponin negative. Orthostatics negative. Labs wnl. O2s wnl. VSS.     Patient seen and examined    ALLERGIES: NKA    HOME MEDICATIONS  None     PAST MEDICAL HISTORY  No significant past medical history     PAST SURGICAL HISTORY   No prior surgical history     SOCIAL HISTORY   Denies cigarette smoking. Daily marijuana use. Denies Etoh abuse     FAMILY HISTORY  Denies family history of DM, HTN or mental illness     No pertinent past medical history      ICU Vital Signs Last 24 Hrs  T(C): 36.3 (19 Sep 2020 20:36), Max: 36.6 (19 Sep 2020 08:29)  T(F): 97.3 (19 Sep 2020 20:36), Max: 97.9 (19 Sep 2020 08:29)  HR: 85 (19 Sep 2020 20:36) (85 - 85)  BP: 105/62 (19 Sep 2020 20:36) (105/62 - 105/62)  BP(mean): --  ABP: --  ABP(mean): --  RR: 18 (19 Sep 2020 20:36) (18 - 18)  SpO2: 98% (19 Sep 2020 20:36) (98% - 100%)        PHYSICAL EXAM:    Constitutional: NAD, awake and alert, well-developed  HEENT: PERR, EOMI, Normal Hearing, MMM  Neck: Soft and supple, No LAD, No JVD  Respiratory: Breath sounds are clear bilaterally, No wheezing, rales or rhonchi  Cardiovascular: S1 and S2, regular rate and rhythm, no Murmurs, gallops or rubs  Gastrointestinal: Bowel Sounds present, soft, nontender, nondistended, no guarding, no rebound  Extremities: No peripheral edema  Vascular: 2+ peripheral pulses  Neurological: A/O x 3, no focal deficits  Musculoskeletal: 5/5 strength b/l upper and lower extremities  Skin: No rashes            MEDICATIONS:  MEDICATIONS  (STANDING):  diVALproex  milliGRAM(s) Oral two times a day  haloperidol    Injectable 7.5 milliGRAM(s) IntraMuscular once  LORazepam   Injectable 2 milliGRAM(s) IntraMuscular once  risperiDONE   Tablet 2 milliGRAM(s) Oral two times a day      LABS: All Labs Reviewed:                        13.0   8.63  )-----------( 265      ( 18 Sep 2020 22:35 )             39.0     09-18    140  |  108  |  21  ----------------------------<  107<H>  3.8   |  29  |  0.91    Ca    8.6      18 Sep 2020 22:35  Mg     2.1     09-18        CARDIAC MARKERS ( 18 Sep 2020 22:35 )  <0.015 ng/mL / x     / x     / x     / x          Blood Culture:     RADIOLOGY/EKG: NSR, no st-t changes           HPI:  36 y/o male with no significant medical history presented as a direct transfer from [facility] where he was brought in by his family for psychosis. Per records, pt verbalizes homicidal ideation of wanting to kill his brother on the direction of GOD so that the brother can be reborn as the patient's son. According to the Pt's father James , pt smoking cannabis daily, not sleeping, talking for the past year. Today, writer tried to interview pt however history is limited because pt is guarded and is a poor historian.     all of history obtained from nursing and physician charts. PT lacks insight however he endorsed the event and stated he was wathcing tv shortly after he took is meds. Felt "woozy" approx 20 min after med pass. [place] a cold sensation throughout his chest and head and believes he passed out. No cp or sob. No leg swelling no hemoptysis. No h/o VTE. No cardiac history. States his bp was "very low" during the event and was sent down to ED    Reconsulted for questionable syncopal like episode for which patient was sent down to ED for workup however per charts, patient was not found to have any acute issues and sent back to . EKG does not demonstrate dynamic changes and has a normal QT on last two reads. Troponin negative. Orthostatics negative. Labs wnl. O2s wnl. VSS. . HR 89. O2S 100% on RA.     Patient seen and examined    ALLERGIES: NKA    HOME MEDICATIONS  None     PAST MEDICAL HISTORY  No significant past medical history     PAST SURGICAL HISTORY   No prior surgical history     SOCIAL HISTORY   Denies cigarette smoking. Daily marijuana use. Denies Etoh abuse     FAMILY HISTORY  Denies family history of DM, HTN or mental illness     No pertinent past medical history      ICU Vital Signs Last 24 Hrs  T(C): 36.3 (19 Sep 2020 20:36), Max: 36.6 (19 Sep 2020 08:29)  T(F): 97.3 (19 Sep 2020 20:36), Max: 97.9 (19 Sep 2020 08:29)  HR: 85 (19 Sep 2020 20:36) (85 - 85)  BP: 105/62 (19 Sep 2020 20:36) (105/62 - 105/62)  BP(mean): --  ABP: --  ABP(mean): --  RR: 18 (19 Sep 2020 20:36) (18 - 18)  SpO2: 98% (19 Sep 2020 20:36) (98% - 100%)        PHYSICAL EXAM:    Constitutional: NAD, awake and alert, well-developed  HEENT: PERR, EOMI, Normal Hearing, MMM  Neck: Soft and supple, No LAD, No JVD  Respiratory: Breath sounds are clear bilaterally, No wheezing, rales or rhonchi  Cardiovascular: S1 and S2, regular rate and rhythm, no Murmurs, gallops or rubs  Gastrointestinal: Bowel Sounds present, soft, nontender, nondistended, no guarding, no rebound  Extremities: No peripheral edema  Vascular: 2+ peripheral pulses  Neurological: A/O x 3, no focal deficits  Musculoskeletal: 5/5 strength b/l upper and lower extremities  Skin: No rashes            MEDICATIONS:  MEDICATIONS  (STANDING):  diVALproex  milliGRAM(s) Oral two times a day  haloperidol    Injectable 7.5 milliGRAM(s) IntraMuscular once  LORazepam   Injectable 2 milliGRAM(s) IntraMuscular once  risperiDONE   Tablet 2 milliGRAM(s) Oral two times a day      LABS: All Labs Reviewed:                        13.0   8.63  )-----------( 265      ( 18 Sep 2020 22:35 )             39.0     09-18    140  |  108  |  21  ----------------------------<  107<H>  3.8   |  29  |  0.91    Ca    8.6      18 Sep 2020 22:35  Mg     2.1     09-18        CARDIAC MARKERS ( 18 Sep 2020 22:35 )  <0.015 ng/mL / x     / x     / x     / x          Blood Culture:     RADIOLOGY/EKG: NSR, no st-t changes

## 2020-09-20 NOTE — CONSULT NOTE ADULT - ASSESSMENT
A:  Syncope  Bipolar disorder with psychotic features   Homicidal ideation  Cannabis use    P:  No dynamic changes on EKG c/w ischemia  Would repeat orthostatics today  Risperdal, haldol and trazodone all known to cause postural dizziness including syncope in post market reports  Risperdal dosing tapered down as per psychiatry notes as patient refusing certain meds d/t c/o headaches/dizziness  Encourage PO intake of water  Repeat another troponin and add D-Dimer. Low Wells score  Care for BPD per primary team  DVT px

## 2020-09-21 PROCEDURE — 99232 SBSQ HOSP IP/OBS MODERATE 35: CPT

## 2020-09-21 PROCEDURE — 93970 EXTREMITY STUDY: CPT | Mod: 26

## 2020-09-21 RX ADMIN — RISPERIDONE 2 MILLIGRAM(S): 4 TABLET ORAL at 20:49

## 2020-09-21 RX ADMIN — RISPERIDONE 2 MILLIGRAM(S): 4 TABLET ORAL at 10:20

## 2020-09-21 NOTE — PROGRESS NOTE BEHAVIORAL HEALTH - SUMMARY
35 yr old single male living with family , no prior psych treatment in or outpt , is unemployed . He has no insight and believes he is not in need of treatment. Pt with daily use of cannabis and for the last year has been with a change in sleep pattern, increased delusions of grandiosity  and now according to family has voiced homicidal ideation towards his brother.  Pt himself denies any suicidal or homicidal ideation intent or plan    Hospitalization course:  9/19/2020: pt fainted last night, was in ED, hydrated,. Now no complaints, father si requesting meds to be held. Ordered hospitalist f/u s/p syncope like episode. LOWER RISPERDAL TO 2 MG PO 2 X A DAY   9/17/2020 Pt refusing the depakote and claiming a headache from the medication , no behavioral issues  9/16/2020 pt submits another letter. but strange behavior of repeatedly spitting into trash can in between talking , still impulsive , added depakote   9/15/2020 Pt served with papers by the   9/14/2020 Pt with administrative meeting tomorrow   9/13/2020 pt claiming nothing is changed    9/12/2020 pt is irritable and agitated tried to discuss about medication with him but too angry  9/11/2020 pt refused the night dose of the Risperdal , remains irritable and delusional  .   9/10/2020  court is scheduled for 9/18/2020 9/9/2020 pt filed court papers for discharge, pt still delusional    9/8/2020 pt took night dose of risperdal   9/7/2020 pt continues with refusal of medication   9/4/2020 Pt with continued delusional thoughts , impulsivity   9/3/2020 Pt continued with refusing to take medication .  Pt with escalating aggressive behavior and was threatening to kill staff and peers , remains with paranoid and grandiose delusions.

## 2020-09-21 NOTE — PROGRESS NOTE BEHAVIORAL HEALTH - NSBHFUPINTERVALHXFT_PSY_A_CORE
Pt with no symptoms and is with no dizziness.  Pt claiming that he would be willing to continue with medication .  Pt so far has not had any behavioral issues.

## 2020-09-21 NOTE — PROGRESS NOTE BEHAVIORAL HEALTH - NSBHCHARTREVIEWVS_PSY_A_CORE FT
Vital Signs Last 24 Hrs  T(C): 37.2 (21 Sep 2020 08:20), Max: 37.2 (21 Sep 2020 08:20)  T(F): 98.9 (21 Sep 2020 08:20), Max: 98.9 (21 Sep 2020 08:20)  HR: 75 (20 Sep 2020 20:16) (75 - 75)  BP: 113/50 (20 Sep 2020 20:16) (113/50 - 113/50)  BP(mean): 64 (20 Sep 2020 20:16) (64 - 64)  RR: 18 (21 Sep 2020 08:20) (18 - 18)  SpO2: 100% (21 Sep 2020 08:20) (99% - 100%)

## 2020-09-21 NOTE — PROGRESS NOTE ADULT - SUBJECTIVE AND OBJECTIVE BOX
Patient is a 35y old  Male who presents with a chief complaint of BPD with psychosis (20 Sep 2020 07:52)      SUBJECTIVE:   HPI:HPI:  36 y/o male with no significant medical history presented as a direct transfer from [facility] where he was brought in by his family for psychosis. Per records, pt verbalizes homicidal ideation of wanting to kill his brother on the direction of GOD so that the brother can be reborn as the patient's son. According to the Pt's father James , pt smoking cannabis daily, not sleeping, talking for the past year. Today, writer tried to interview pt however history is limited because pt is guarded and is a poor historian.     all of history obtained from nursing and physician charts. PT lacks insight however he endorsed the event and stated he was wathcing tv shortly after he took is meds. Felt "woozy" approx 20 min after med pass. [place] a cold sensation throughout his chest and head and believes he passed out. No cp or sob. No leg swelling no hemoptysis. No h/o VTE. No cardiac history. States his bp was "very low" during the event and was sent down to ED    Reconsulted for questionable syncopal like episode for which patient was sent down to ED for workup however per charts, patient was not found to have any acute issues and sent back to . EKG does not demonstrate dynamic changes and has a normal QT on last two reads. Troponin negative. Orthostatics negative. Labs wnl. O2s wnl. VSS. . HR 89. O2S 100% on RA.     Patient seen and examined          REVIEW OF SYSTEMS:    CONSTITUTIONAL: No weakness, fevers or chills  EYES/ENT: No visual changes;  No vertigo or throat pain   NECK: No pain or stiffness  RESPIRATORY: No cough, wheezing, hemoptysis; No shortness of breath  CARDIOVASCULAR: No chest pain or palpitations  GASTROINTESTINAL: No abdominal or epigastric pain. No nausea, vomiting, or hematemesis; No diarrhea or constipation. No melena or hematochezia.  GENITOURINARY: No dysuria, frequency or hematuria  NEUROLOGICAL: No numbness or weakness  SKIN: No itching, burning, rashes, or lesions   All other review of systems is negative unless indicated above      ICU Vital Signs Last 24 Hrs  T(C): 36.2 (20 Sep 2020 20:16), Max: 36.7 (20 Sep 2020 07:56)  T(F): 97.2 (20 Sep 2020 20:16), Max: 98.1 (20 Sep 2020 07:56)  HR: 75 (20 Sep 2020 20:16) (75 - 75)  BP: 113/50 (20 Sep 2020 20:16) (113/50 - 113/50)  BP(mean): 64 (20 Sep 2020 20:16) (64 - 64)  ABP: --  ABP(mean): --  RR: 18 (20 Sep 2020 20:16) (18 - 18)  SpO2: 99% (20 Sep 2020 20:16) (99% - 100%)            PHYSICAL EXAM:    Constitutional: NAD, awake and alert, well-developed  HEENT: PERR, EOMI, Normal Hearing, MMM  Neck: Soft and supple, No LAD, No JVD  Respiratory: Breath sounds are clear bilaterally, No wheezing, rales or rhonchi  Cardiovascular: S1 and S2, regular rate and rhythm, no Murmurs, gallops or rubs  Gastrointestinal: Bowel Sounds present, soft, nontender, nondistended, no guarding, no rebound  Extremities: No peripheral edema  Vascular: 2+ peripheral pulses  Neurological: A/O x 3, no focal deficits  Musculoskeletal: 5/5 strength b/l upper and lower extremities  Skin: No rashes    MEDICATIONS:  MEDICATIONS  (STANDING):  diVALproex  milliGRAM(s) Oral two times a day  haloperidol    Injectable 7.5 milliGRAM(s) IntraMuscular once  LORazepam   Injectable 2 milliGRAM(s) IntraMuscular once  risperiDONE   Tablet 2 milliGRAM(s) Oral two times a day      LABS: All Labs Reviewed:            CARDIAC MARKERS ( 20 Sep 2020 13:13 )  <0.015 ng/mL / x     / x     / x     / x              Blood Culture:     RADIOLOGY/EKG:    reviewed

## 2020-09-21 NOTE — PROGRESS NOTE BEHAVIORAL HEALTH - OTHER
pt denies any hallucinations but appears preoccupied and messages as "thoughts" pt is superficially cooperative, is guarded

## 2020-09-21 NOTE — PROGRESS NOTE ADULT - ASSESSMENT
ssessment and Recommendation:   · Assessment	  A:  Syncope  Bipolar disorder with psychotic features   Homicidal ideation  Cannabis use    P:  No dynamic changes on EKG c/w ischemia  Would repeat orthostatics today: negative  Risperdal, haldol and trazodone all known to cause postural dizziness including syncope in post market reports  Risperdal dosing tapered down as per psychiatry notes as patient refusing certain meds d/t c/o headaches/dizziness  Encourage PO intake of water  Repeat another troponin and add D-Dimer. Low Wells score: dimer 231-> low risk. obtain LE dopplers  Care for BPD per primary team  DVT px      Attending Attestation:   Plan discussed with staff.

## 2020-09-22 PROCEDURE — 99232 SBSQ HOSP IP/OBS MODERATE 35: CPT

## 2020-09-22 RX ADMIN — RISPERIDONE 2 MILLIGRAM(S): 4 TABLET ORAL at 20:53

## 2020-09-22 RX ADMIN — RISPERIDONE 2 MILLIGRAM(S): 4 TABLET ORAL at 09:22

## 2020-09-22 NOTE — PROGRESS NOTE ADULT - SUBJECTIVE AND OBJECTIVE BOX
Patient is a 35y old  Male who presents with a chief complaint of BPD with psychosis (20 Sep 2020 07:52)      SUBJECTIVE:   HPI:HPI:  34 y/o male with no significant medical history presented as a direct transfer from [facility] where he was brought in by his family for psychosis. Per records, pt verbalizes homicidal ideation of wanting to kill his brother on the direction of GOD so that the brother can be reborn as the patient's son. According to the Pt's father James , pt smoking cannabis daily, not sleeping, talking for the past year. Today, writer tried to interview pt however history is limited because pt is guarded and is a poor historian.     all of history obtained from nursing and physician charts. PT lacks insight however he endorsed the event and stated he was wathcing tv shortly after he took is meds. Felt "woozy" approx 20 min after med pass. [place] a cold sensation throughout his chest and head and believes he passed out. No cp or sob. No leg swelling no hemoptysis. No h/o VTE. No cardiac history. States his bp was "very low" during the event and was sent down to ED    Reconsulted for questionable syncopal like episode for which patient was sent down to ED for workup however per charts, patient was not found to have any acute issues and sent back to . EKG does not demonstrate dynamic changes and has a normal QT on last two reads. Troponin negative. Orthostatics negative. Labs wnl. O2s wnl. VSS. . HR 89. O2S 100% on RA.     Patient seen and examined          REVIEW OF SYSTEMS:    CONSTITUTIONAL: No weakness, fevers or chills  EYES/ENT: No visual changes;  No vertigo or throat pain   NECK: No pain or stiffness  RESPIRATORY: No cough, wheezing, hemoptysis; No shortness of breath  CARDIOVASCULAR: No chest pain or palpitations  GASTROINTESTINAL: No abdominal or epigastric pain. No nausea, vomiting, or hematemesis; No diarrhea or constipation. No melena or hematochezia.  GENITOURINARY: No dysuria, frequency or hematuria  NEUROLOGICAL: No numbness or weakness  SKIN: No itching, burning, rashes, or lesions   All other review of systems is negative unless indicated above      ICU Vital Signs Last 24 Hrs  T(C): 36.8 (21 Sep 2020 20:08), Max: 36.8 (21 Sep 2020 20:08)  T(F): 98.2 (21 Sep 2020 20:08), Max: 98.2 (21 Sep 2020 20:08)  HR: 80 (21 Sep 2020 20:08) (80 - 80)  BP: 105/60 (21 Sep 2020 20:08) (105/60 - 105/60)  BP(mean): 66 (21 Sep 2020 20:08) (66 - 66)  ABP: --  ABP(mean): --  RR: 18 (21 Sep 2020 20:08) (18 - 18)  SpO2: 100% (21 Sep 2020 20:08) (100% - 100%)            PHYSICAL EXAM:    Constitutional: NAD, awake and alert, well-developed  HEENT: PERR, EOMI, Normal Hearing, MMM  Neck: Soft and supple, No LAD, No JVD  Respiratory: Breath sounds are clear bilaterally, No wheezing, rales or rhonchi  Cardiovascular: S1 and S2, regular rate and rhythm, no Murmurs, gallops or rubs  Gastrointestinal: Bowel Sounds present, soft, nontender, nondistended, no guarding, no rebound  Extremities: No peripheral edema  Vascular: 2+ peripheral pulses  Neurological: A/O x 3, no focal deficits  Musculoskeletal: 5/5 strength b/l upper and lower extremities  Skin: No rashes    MEDICATIONS:  MEDICATIONS  (STANDING):  diVALproex  milliGRAM(s) Oral two times a day  haloperidol    Injectable 7.5 milliGRAM(s) IntraMuscular once  LORazepam   Injectable 2 milliGRAM(s) IntraMuscular once  risperiDONE   Tablet 2 milliGRAM(s) Oral two times a day      LABS: All Labs Reviewed:            CARDIAC MARKERS ( 20 Sep 2020 13:13 )  <0.015 ng/mL / x     / x     / x     / x              Blood Culture:     RADIOLOGY/EKG:    reviewed

## 2020-09-22 NOTE — PROGRESS NOTE BEHAVIORAL HEALTH - NSBHCHARTREVIEWVS_PSY_A_CORE FT
Vital Signs Last 24 Hrs  T(C): 36.5 (22 Sep 2020 09:33), Max: 36.8 (21 Sep 2020 20:08)  T(F): 97.7 (22 Sep 2020 09:33), Max: 98.2 (21 Sep 2020 20:08)  HR: 80 (21 Sep 2020 20:08) (80 - 80)  BP: 105/60 (21 Sep 2020 20:08) (105/60 - 105/60)  BP(mean): 66 (21 Sep 2020 20:08) (66 - 66)  RR: 12 (22 Sep 2020 09:33) (12 - 18)  SpO2: 100% (22 Sep 2020 09:33) (100% - 100%)

## 2020-09-22 NOTE — PROGRESS NOTE ADULT - ASSESSMENT
ssessment and Recommendation:   · Assessment	  A:  Syncope  Bipolar disorder with psychotic features   Homicidal ideation  Cannabis use    P:  No dynamic changes on EKG c/w ischemia  Would repeat orthostatics today: negative  Risperdal, haldol and trazodone all known to cause postural dizziness including syncope in post market reports  Risperdal dosing tapered down as per psychiatry notes as patient refusing certain meds d/t c/o headaches/dizziness  Encourage PO intake of water  Repeat another troponin and add D-Dimer. Low Wells score: dimer 231-> low risk.   LE doppler: negative  Care for BPD per primary team  DVT px      Attending Attestation:   Plan discussed with staff.

## 2020-09-23 PROCEDURE — 99232 SBSQ HOSP IP/OBS MODERATE 35: CPT

## 2020-09-23 RX ADMIN — RISPERIDONE 2 MILLIGRAM(S): 4 TABLET ORAL at 09:02

## 2020-09-23 RX ADMIN — RISPERIDONE 2 MILLIGRAM(S): 4 TABLET ORAL at 20:31

## 2020-09-23 NOTE — PROGRESS NOTE BEHAVIORAL HEALTH - SUMMARY
35 yr old single male living with family , no prior psych treatment in or outpt , is unemployed . He has no insight and believes he is not in need of treatment. Pt with daily use of cannabis and for the last year has been with a change in sleep pattern, increased delusions of grandiosity  and now according to family has voiced homicidal ideation towards his brother.  Pt himself denies any suicidal or homicidal ideation intent or plan    Hospitalization course:  9/23/2020 Pt maintaining behavioral control.   9/19/2020: pt fainted last night, was in ED, hydrated,. Now no complaints, father si requesting meds to be held. Ordered hospitalist f/u s/p syncope like episode. LOWER RISPERDAL TO 2 MG PO 2 X A DAY   9/17/2020 Pt refusing the depakote and claiming a headache from the medication , no behavioral issues  9/16/2020 pt submits another letter. but strange behavior of repeatedly spitting into trash can in between talking , still impulsive , added depakote   9/15/2020 Pt served with papers by the   9/14/2020 Pt with administrative meeting tomorrow   9/13/2020 pt claiming nothing is changed    9/12/2020 pt is irritable and agitated tried to discuss about medication with him but too angry  9/11/2020 pt refused the night dose of the Risperdal , remains irritable and delusional  .   9/10/2020  court is scheduled for 9/18/2020 9/9/2020 pt filed court papers for discharge, pt still delusional    9/8/2020 pt took night dose of risperdal   9/7/2020 pt continues with refusal of medication   9/4/2020 Pt with continued delusional thoughts , impulsivity   9/3/2020 Pt continued with refusing to take medication .  Pt with escalating aggressive behavior and was threatening to kill staff and peers , remains with paranoid and grandiose delusions.

## 2020-09-23 NOTE — DISCHARGE NOTE BEHAVIORAL HEALTH - NSBHDCCRISISPLAN3FT_PSY_A_CORE
Discuss in treatment with counselor, attended a local/online self-help group, call a hotline ([facility] ([facility]stance Abuse [facility])[phone])

## 2020-09-23 NOTE — DISCHARGE NOTE BEHAVIORAL HEALTH - NSBHDCCONDITIONFT_PSY_A_CORE
improved with the pt denying any hallucination Denies any suicidal or homicidal ideation intent or plan

## 2020-09-23 NOTE — DISCHARGE NOTE BEHAVIORAL HEALTH - NSBHDCPURPOSE2FT_PSY_A_CORE
Outpatient Mental Health Treatment. You have an appointment via telehealth with Dr. Chávez. Dr. Chávez with reach out to you via telephone, but he can also be reached at [phone].

## 2020-09-23 NOTE — DISCHARGE NOTE BEHAVIORAL HEALTH - NSBHDCCRISISPLAN2FT_PSY_A_CORE
Call [facility]N: [phone]   - Mary Madrigal LMSW  Psychiatrist - Dr. Vanda Ruth

## 2020-09-23 NOTE — DISCHARGE NOTE BEHAVIORAL HEALTH - NSBHDCVIOLFCTRSFT_PSY_A_CORE
1. psychosis- no delusions elicited . Pt denies any hallucination    2. homicidal ideation - pt has since denied any homicidal ideation intent or plan

## 2020-09-23 NOTE — PROGRESS NOTE BEHAVIORAL HEALTH - NSBHFUPINTERVALHXFT_PSY_A_CORE
Pt denies any suicidal or homicidal ideation intent or plan   Pt is attending groups  and has been able to maintain behavioral control.  Pt claiming to no longer have belief that he is Richard or Satan.

## 2020-09-23 NOTE — PROGRESS NOTE BEHAVIORAL HEALTH - RISK ASSESSMENT
low risk for aggression   acute: denies hallucination , maintaining  behavior,, involved in groups , no  aggressive behavior, no longer threatening  other people,     mitigating : denies any suicidal ideation intent or plan  chronic: lack of insight, use of cannabis

## 2020-09-23 NOTE — DISCHARGE NOTE BEHAVIORAL HEALTH - NSBHDCSWCOMMENTSFT_PSY_A_CORE
Pt. was educated on the importance of taking medications as prescribed and to not stop or miss any doses unless directed by prescribing provider. Pt. was counseled on the importance of attending outpatient appointments for ongoing development of coping skills in particular. Pt. was made aware of crisis resources to use after hours as needed.

## 2020-09-23 NOTE — DISCHARGE NOTE BEHAVIORAL HEALTH - NSBHDCCRISISPLAN1FT_PSY_A_CORE
Tell a trusted friend/family member, tell housing staff, tell clinic staff, call a crisis line ( Crisis Center ph. [phone], [facility] [phone], [facility] (peer support) ph. [phone]), return to the nearest emergency room. You may call 9-1-1 for assistance.

## 2020-09-23 NOTE — DISCHARGE NOTE BEHAVIORAL HEALTH - NSBHDCSIGEVENTSFT_PSY_A_CORE
Pt initially with increased irritable mood and grandiose ideation . Pt with initial response to internal stimuli and increased agitated aggressive behavior .

## 2020-09-23 NOTE — DISCHARGE NOTE BEHAVIORAL HEALTH - MEDICATION SUMMARY - MEDICATIONS TO TAKE
I will START or STAY ON the medications listed below when I get home from the hospital:    traZODone 50 mg oral tablet  -- 1 tab(s) by mouth once a day (at bedtime), As needed, insomnia  -- Indication: For insomnia    risperiDONE 2 mg oral tablet  -- 1 tab(s) by mouth 2 times a day  -- Indication: For Bipolar affective

## 2020-09-23 NOTE — DISCHARGE NOTE BEHAVIORAL HEALTH - NSBHDCRESPONSEFT_PSY_A_CORE
improved with denial of any suicidal or homicidal ideation intent or plan Pt with no delusions elicited.  Pt with return of sleep and appetite.  Pt attending groups and was with euthymic mood and affect is mood congruent  Pt has remained in behavioral control for the entire week and has been compliant with medication and attended groups with no difficulty . No behavioral issue or incidence.

## 2020-09-23 NOTE — DISCHARGE NOTE BEHAVIORAL HEALTH - REASON FOR ADMISSION
Per pt's BH Assessment completed by Dr. Ruth on 8/81/2020: " Pt claims he is a descendant of [facility] and that he is the second coming o Richard and listening to the word of God"

## 2020-09-23 NOTE — DISCHARGE NOTE BEHAVIORAL HEALTH - NSBHDCPURPOSE1FT_PSY_A_CORE
Outpatient Mental Health Treatment Outpatient Mental Health Treatment. Your appointment is in person with Martha Elizalde for therapy. Martha Elizalde can be reached at [phone].

## 2020-09-23 NOTE — DISCHARGE NOTE BEHAVIORAL HEALTH - NSBHDCVIOLSAFETYFT_PSY_A_CORE
Advised to return to hospital or go to nearest ED or call 911 or [phone]) LIFENET or [phone] TALK hotlines for any severe, worsening or persistent symptoms including suicidal/homicidal ideations, intent or plans. Patient verbalized understanding of instructions.

## 2020-09-23 NOTE — PROGRESS NOTE BEHAVIORAL HEALTH - NSBHCHARTREVIEWVS_PSY_A_CORE FT
Vital Signs Last 24 Hrs  T(C): 36.7 (23 Sep 2020 07:49), Max: 36.8 (22 Sep 2020 20:48)  T(F): 98 (23 Sep 2020 07:49), Max: 98.2 (22 Sep 2020 20:48)  HR: 100 (22 Sep 2020 20:48) (100 - 100)  BP: 109/60 (22 Sep 2020 20:48) (109/60 - 109/60)  BP(mean): 72 (22 Sep 2020 20:48) (72 - 72)  RR: 18 (23 Sep 2020 07:49) (18 - 18)  SpO2: 100% (23 Sep 2020 07:49) (100% - 100%)

## 2020-09-23 NOTE — DISCHARGE NOTE BEHAVIORAL HEALTH - NSBHDCALCOHOLREFERFT_PSY_A_CORE
Pt to address concerns related to substance abuse in outpatient treatment at [facility].

## 2020-09-23 NOTE — DISCHARGE NOTE BEHAVIORAL HEALTH - NSBHDCREFEROTHERFT_PSY_A_CORE
[facility]- for psychiatric crises (available AFTER HOURS)  24/7 hotline: [phone]  Address:  [address], [place] [address]

## 2020-09-23 NOTE — DISCHARGE NOTE BEHAVIORAL HEALTH - NSBHDCTHERAPYFT_PSY_A_CORE
Pt provided with individual and group therapies including safety planning and coping skills along with pt psychoeducation related to diagnosis and treatment options including medications.

## 2020-09-23 NOTE — DISCHARGE NOTE BEHAVIORAL HEALTH - HPI (INCLUDE ILLNESS QUALITY, SEVERITY, DURATION, TIMING, CONTEXT, MODIFYING FACTORS, ASSOCIATED SIGNS AND SYMPTOMS)
Per pt's  Assessment completed by Dr. Ruth on 8/81/2020: "Pt is a 35 yr old  single male , no prior psych hospitalization hx, BIB family to [facility] , with homicidal ideation of wanting to kill his brother on the direction of GOD so that the brother  can be reborn as the patient's son. According to the Pt's father James , pt smoking cannabis daily, not sleeping, talking to self.  Pt apparently with poor sleep and response to internal stimuli  since 2019 according to the father but he was not brought in for treatment until he started to voice homicidal ideations.  	Pt with grandiose delusions. Pt with no thought of needing treatment    	According to referral material from [facility] pt indicated that he had a traumatic break up with his girlfriend  of 5 years in Dec 2019 . Pt had since then had "been in contact with GOD and was writing testaments in the bible ." Claimed that he was exercising more, he was denying any homicidal ideation and claimed that he did not know why the family would say he said this." Claims he would get "GOD 's thoughts in his head seeing the holy spirit move his computer in the middle of the night. "

## 2020-09-23 NOTE — DISCHARGE NOTE BEHAVIORAL HEALTH - NSBHDCMEDSFT_PSY_A_CORE
Pt educated not to stop taking medications unless told to do so his provider. Pt educated not to stop taking medications unless told to do so  by his provider.  MEDICATIONS  (STANDING):  diVALproex  milliGRAM(s) Oral two times a day  haloperidol    Injectable 7.5 milliGRAM(s) IntraMuscular once  LORazepam   Injectable 2 milliGRAM(s) IntraMuscular once  risperiDONE   Tablet 2 milliGRAM(s) Oral two times a day  Pt declined the need for conversion to risperdal consta  and this was also agreed with by his family as they did not wish to have the pt taking injectable form of medication  .  Pt denies any suicidal or homicidal ideation intent or plan but will say this in general and avoid giving any specific information . Pt's parents are aware of the patients discharge and aftercare and are in agreement with all of it. The family is aware of the pts initial threat to harm his brother Thiago and the father and mother are aware of this and are refusing to give information to contact Thiago . The parents are claiming that they are not having any concerns for the patient to be discharge at this time.

## 2020-09-23 NOTE — DISCHARGE NOTE BEHAVIORAL HEALTH - CONDITIONS AT DISCHARGE
Patient A&Ox4 denies S/I/I/P. Patient accepting discharge plan and aftercare appointments, reviewed and patient verbalized an understanding by teach back. All personal property given to patient on discharge, no valuables brought to hospital. Patient was discharged to family for safe discharge plan and patient was appropriately dressed for the weather. Patient A&Ox4 denies S/I/I/P. Patient accepting discharge plan and aftercare appointments, reviewed and patient verbalized an understanding by teach back. All personal property given to patient on discharge, no valuables brought to hospital. Patient was discharged to family for safe discharge plan and patient was appropriately dressed for the weather. Safety plan reviewed with patient.

## 2020-09-24 PROCEDURE — 99232 SBSQ HOSP IP/OBS MODERATE 35: CPT

## 2020-09-24 RX ORDER — TRAZODONE HCL 50 MG
1 TABLET ORAL
Qty: 15 | Refills: 1
Start: 2020-09-24 | End: 2020-10-23

## 2020-09-24 RX ORDER — RISPERIDONE 4 MG/1
1 TABLET ORAL
Qty: 28 | Refills: 1
Start: 2020-09-24 | End: 2020-10-21

## 2020-09-24 RX ADMIN — RISPERIDONE 2 MILLIGRAM(S): 4 TABLET ORAL at 20:17

## 2020-09-24 RX ADMIN — RISPERIDONE 2 MILLIGRAM(S): 4 TABLET ORAL at 09:21

## 2020-09-24 NOTE — PROGRESS NOTE BEHAVIORAL HEALTH - SUMMARY
35 yr old single male living with family , no prior psych treatment in or outpt , is unemployed . He has no insight and believes he is not in need of treatment. Pt with daily use of cannabis and for the last year has been with a change in sleep pattern, increased delusions of grandiosity  and now according to family has voiced homicidal ideation towards his brother.  Pt himself denies any suicidal or homicidal ideation intent or plan    Hospitalization course:  9/24/2020  pt denies any suicidal or homicidal ideation intent or plan .  Pt has been in behavioral control.  9/23/2020 Pt maintaining behavioral control.   9/19/2020: pt fainted last night, was in ED, hydrated,. Now no complaints, father si requesting meds to be held. Ordered hospitalist f/u s/p syncope like episode. LOWER RISPERDAL TO 2 MG PO 2 X A DAY   9/17/2020 Pt refusing the depakote and claiming a headache from the medication , no behavioral issues  9/16/2020 pt submits another letter. but strange behavior of repeatedly spitting into trash can in between talking , still impulsive , added depakote   9/15/2020 Pt served with papers by the   9/14/2020 Pt with administrative meeting tomorrow   9/13/2020 pt claiming nothing is changed    9/12/2020 pt is irritable and agitated tried to discuss about medication with him but too angry  9/11/2020 pt refused the night dose of the Risperdal , remains irritable and delusional  .   9/10/2020  court is scheduled for 9/18/2020 9/9/2020 pt filed court papers for discharge, pt still delusional    9/8/2020 pt took night dose of risperdal   9/7/2020 pt continues with refusal of medication   9/4/2020 Pt with continued delusional thoughts , impulsivity   9/3/2020 Pt continued with refusing to take medication .  Pt with escalating aggressive behavior and was threatening to kill staff and peers , remains with paranoid and grandiose delusions.

## 2020-09-24 NOTE — PROGRESS NOTE BEHAVIORAL HEALTH - NSBHFUPINTERVALHXFT_PSY_A_CORE
Pt denies any suicidal or homicidal ideation intent or plan   Pt is attending groups  and has been able to maintain behavioral control.  Pt claiming to no longer have belief that he is Richard or Satan. Pt denies any suicidal or homicidal ideation intent or plan   Pt is attending groups  and has been able to maintain behavioral control.  Pt claiming to no longer have belief that he is Richard or Satan.Pt denies any thoughts intent or plan to harm self or others bur refuing to talk in more specific detail.   Pt family is aware of the planned discharge and is in agreement to the plan and the aftercare treatment . The parents are refusing to give any information to contact the pt's brother Thiago as part of duty to warn .  Pt's parents were the ones alerting that pt with homicidal thoughts toward Thiago and are now preventing Thiago from being notified. The parents have decided that they would take  the pt to follow up appts and that they are knowing refusing to have Thiago be notified by the treatment team.

## 2020-09-24 NOTE — PROGRESS NOTE BEHAVIORAL HEALTH - NSBHFUPINTERVALCCFT_PSY_A_CORE
"I'm still taking the medications" "I no longer believe anything I may have talked about. I don't have any suicidal or homicidal thoughts about anyone."

## 2020-09-24 NOTE — PROGRESS NOTE BEHAVIORAL HEALTH - NSBHCHARTREVIEWVS_PSY_A_CORE FT
Vital Signs Last 24 Hrs  T(C): 36.5 (24 Sep 2020 08:07), Max: 36.5 (24 Sep 2020 08:07)  T(F): 97.7 (24 Sep 2020 08:07), Max: 97.7 (24 Sep 2020 08:07)  HR: --  BP: --  BP(mean): --  RR: 18 (24 Sep 2020 08:07) (18 - 18)  SpO2: 100% (24 Sep 2020 08:07) (100% - 100%)

## 2020-09-25 VITALS — RESPIRATION RATE: 14 BRPM | OXYGEN SATURATION: 100 % | TEMPERATURE: 99 F

## 2020-09-25 PROCEDURE — 99232 SBSQ HOSP IP/OBS MODERATE 35: CPT

## 2020-09-25 RX ADMIN — RISPERIDONE 2 MILLIGRAM(S): 4 TABLET ORAL at 09:24

## 2020-09-25 NOTE — PROGRESS NOTE BEHAVIORAL HEALTH - AFFECT CONGRUENCE
Not congruent
Congruent
Not congruent
Congruent
Not congruent
Congruent
Not congruent

## 2020-09-25 NOTE — PROGRESS NOTE BEHAVIORAL HEALTH - SECONDARY DX2
Cannabis abuse with psychotic disorder
Cannabis abuse
Cannabis abuse with psychotic disorder
Cannabis abuse

## 2020-09-25 NOTE — PROGRESS NOTE BEHAVIORAL HEALTH - NSBHPTASSESSDT_PSY_A_CORE
01-Sep-2020 14:37
04-Sep-2020 09:49
05-Sep-2020 12:44
06-Sep-2020 12:59
07-Sep-2020 13:18
08-Sep-2020 20:12
09-Sep-2020 11:25
13-Sep-2020 14:38
15-Sep-2020 19:43
16-Sep-2020 14:31
17-Sep-2020 11:21
18-Sep-2020 19:19
19-Sep-2020 10:25
22-Sep-2020 18:14
23-Sep-2020 17:22
24-Sep-2020 16:01
24-Sep-2020 17:05
25-Sep-2020 09:00
02-Sep-2020 18:03
03-Sep-2020 14:51
14-Sep-2020 11:44
21-Sep-2020 18:31
11-Sep-2020 12:05
10-Sep-2020 15:45
08-Sep-2020 06:59

## 2020-09-25 NOTE — PROGRESS NOTE BEHAVIORAL HEALTH - NSBHFUPMEDSE_PSY_A_CORE
None known

## 2020-09-25 NOTE — PROGRESS NOTE BEHAVIORAL HEALTH - AXIS III
allergies :advil  and PCN

## 2020-09-25 NOTE — PROGRESS NOTE BEHAVIORAL HEALTH - NSBHFUPVIOL_PSY_A_CORE
none known
yes
none known
yes
unable to assess
none known
yes
none known
yes
none known
yes
none known

## 2020-09-25 NOTE — PROGRESS NOTE BEHAVIORAL HEALTH - REMOTE MEMORY
Normal
none

## 2020-09-25 NOTE — PROGRESS NOTE BEHAVIORAL HEALTH - RELATEDNESS
Poor
Fair
Poor
Fair
Poor
Fair
Poor

## 2020-09-25 NOTE — PROGRESS NOTE BEHAVIORAL HEALTH - BEHAVIOR
Cooperative/Other
Other
Other/Cooperative
Cooperative/Other
Other
Hostile/Other
Other
Cooperative/Other
Other
Other/Cooperative
Cooperative/Other
Other/Cooperative
Cooperative/Other
Cooperative/Other
Other
Cooperative/Other

## 2020-09-25 NOTE — PROGRESS NOTE BEHAVIORAL HEALTH - ADDITIONAL DETAILS / COMMENTS
Safe Act reported by [facility] as the pt had guns in his home and with homicidal ideation
Safe Act reported by [facility] as the pt had guns in his home and with homicidal ideation, Pt served by the   [facility]  on behalf of the [place] [address] of the [facility] case # [id number] ,
Safe Act reported by [facility] as the pt had guns in his home and with homicidal ideation

## 2020-09-25 NOTE — PROGRESS NOTE BEHAVIORAL HEALTH - NSBHADMITIPOBSFT_PSY_A_CORE
opt denial of suicidal or homicidal ideation

## 2020-09-25 NOTE — PROGRESS NOTE BEHAVIORAL HEALTH - ESTIMATED INTELLIGENCE
Average

## 2020-09-25 NOTE — PROGRESS NOTE BEHAVIORAL HEALTH - NSBHFUPINTERVALHXFT_PSY_A_CORE
Pt with denial of any suicidal or homicidal ideation intent or plan Pt with behavioral control and has not been verbally or physically aggressive. He verbalized willing to continue with medication and to attend aftercare treatment .  His parents with whom he lives with are aware that he is being discharged today and they are in agreement with the plan and of the aftercare appts. . The parents are aware of where Thiago their other son is located but they are not divulging any information to allow contact with him to indicate that his brother is being discharged today

## 2020-09-25 NOTE — PROGRESS NOTE BEHAVIORAL HEALTH - NSBHLEGALSTATUS_PSY_A_CORE
9.27 (2PC)

## 2020-09-25 NOTE — PROGRESS NOTE BEHAVIORAL HEALTH - NSBHFUPSUICINTERVAL_PSY_A_CORE
none known
yes
yes
none known
yes
none known
yes
none known
none known
yes
none known

## 2020-09-25 NOTE — PROGRESS NOTE BEHAVIORAL HEALTH - PROBLEM SELECTOR PROBLEM 3
Cannabis abuse

## 2020-09-25 NOTE — PROGRESS NOTE BEHAVIORAL HEALTH - NSBHCHARTREVIEWVS_PSY_A_CORE FT
Counseling and Referral of Other Preventative  (Italic type indicates deductible and co-insurance are waived)    Patient Name: Crystal Romero  Today's Date: 6/18/2020    Health Maintenance       Date Due Completion Date    Shingles Vaccine (1 of 2) 11/21/1984 ---    DEXA SCAN 02/10/2020 2/10/2015    Override on 12/2/2013: Declined    Colonoscopy 06/24/2021 (Originally 4/7/2018) 4/7/2015    Override on 12/2/2013: Declined    Lipid Panel 03/12/2021 3/12/2020    Aspirin/Antiplatelet Therapy 06/18/2021 6/18/2020    TETANUS VACCINE 07/20/2025 7/20/2015        No orders of the defined types were placed in this encounter.    The following information is provided to all patients.  This information is to help you find resources for any of the problems found today that may be affecting your health:                Living healthy guide: www.[facility].[place].[facility]      Understanding Diabetes: www.diabetes.org      Eating healthy: www.cdc.gov/healthyweight      [facility] home safety checklist: www.cdc.gov/steadi/patient.html      Agency on Aging: www.goea.[place].[facility]      Alcoholics anonymous (AA): www.aa.org      Physical Activity: www.josefa.nih.gov/ts3nplg      Tobacco use: www.quitwithusla.org      Vital Signs Last 24 Hrs  T(C): 36.5 (24 Sep 2020 08:07), Max: 36.5 (24 Sep 2020 08:07)  T(F): 97.7 (24 Sep 2020 08:07), Max: 97.7 (24 Sep 2020 08:07)  HR: --  BP: --  BP(mean): --  RR: 18 (24 Sep 2020 08:07) (18 - 18)  SpO2: 100% (24 Sep 2020 08:07) (100% - 100%)

## 2020-09-25 NOTE — PROGRESS NOTE BEHAVIORAL HEALTH - ORIENTED TO SITUATION
Yes
Advancement-Rotation Flap Text: The defect edges were debeveled with a #15 scalpel blade.  Given the location of the defect, shape of the defect and the proximity to free margins an advancement-rotation flap was deemed most appropriate.  Using a sterile surgical marker, an appropriate flap was drawn incorporating the defect and placing the expected incisions within the relaxed skin tension lines where possible. The area thus outlined was incised deep to adipose tissue with a #15 scalpel blade.  The skin margins were undermined to an appropriate distance in all directions utilizing iris scissors.
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
## 2020-09-25 NOTE — PROGRESS NOTE BEHAVIORAL HEALTH - EYE CONTACT
Poor
Poor
Fair
Poor
Poor
Fair
Poor
Good
Poor
Good
Poor
Good
Poor

## 2020-09-25 NOTE — PROGRESS NOTE BEHAVIORAL HEALTH - NSBHFUPTYPE_PSY_A_CORE
Inpatient
Inpatient-On Service Note
Inpatient
Inpatient-On Service Note

## 2020-09-25 NOTE — PROGRESS NOTE BEHAVIORAL HEALTH - PROBLEM SELECTOR PLAN 2
encourage to attend groups for insight and relapse prevention
encourage the pt to attend groups for relapse prevention plan
encourage to attend groups for insight and relapse prevention

## 2020-09-25 NOTE — PROGRESS NOTE BEHAVIORAL HEALTH - PROBLEM SELECTOR PROBLEM 2
Cannabis abuse
Cannabis abuse with psychotic disorder
Cannabis abuse
Cannabis abuse with psychotic disorder

## 2020-09-25 NOTE — PROGRESS NOTE BEHAVIORAL HEALTH - PROBLEM SELECTOR PLAN 3
encourage the pt to attend groups for relapse prevention plan

## 2020-09-25 NOTE — PROGRESS NOTE BEHAVIORAL HEALTH - NS ED BHA AXIS I SECONDARY2 CODE FT
F12.159
F12.10
F12.159
F12.159
F12.10

## 2020-09-28 NOTE — PROGRESS NOTE BEHAVIORAL HEALTH - OTHER
Constipation and Colonoscopy      HPI  Patient today for consultation regarding chronic constipation.    Patient presents today with concerns about constipation. She reports she has tried over the counter women's laxative and senokot with no improvement in her symptoms. She reports this has been an issue for several years. She has been taking Linzess 145 mcg daily with no improvement in symptoms. Stool often comes out in small pellets.    She denies any rectal bleeding. She has intentionally lost 20 lbs.    She reports she had a colonoscopy in 2011. She reports she had the virtual due to having a kink in her colon.    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 She reports this was in her lower abdomen. She reports this was years ago and it was surgically resected. She reports she had been evaluated at [facility] for a growth in her rectum in September 2019. She spends the bowles in [place] and ultimately had the surgery there December 2019. She reports the surgery went well. She is uncertain what the growth was.    Review of Systems   Constitutional: Negative for appetite change, chills, diaphoresis, fatigue, fever and unexpected weight change.   HENT: Negative for dental problem, ear pain, mouth sores, rhinorrhea, sore throat and voice change.    Eyes: Negative for pain, redness and visual disturbance.   Respiratory: Negative for cough, chest tightness and wheezing.    Cardiovascular: Negative for chest pain, palpitations and leg swelling.   Endocrine: Negative for cold intolerance, heat intolerance, polydipsia, polyphagia and polyuria.   Genitourinary: Negative for dysuria, frequency, hematuria and urgency.   Musculoskeletal: Positive for arthralgias, back pain and myalgias. Negative for joint swelling and neck pain.   Skin: Negative for rash.   Allergic/Immunologic: Negative for environmental allergies, food allergies and immunocompromised state.   Neurological:  Negative for dizziness, seizures, weakness, numbness and headaches.   Hematological: Does not bruise/bleed easily.   Psychiatric/Behavioral: Negative for sleep disturbance. The patient is not nervous/anxious.         I have reviewed and confirmed the accuracy of the HPI and ROS as documented by the APRN KATHY Braden     Problem List:  There is no problem list on file for this patient.      Medical History:    Past Medical History:   Diagnosis Date   • CHF (congestive heart failure) (CMS/HCC)    • Constipation    • Fibromyalgia    • Hyperlipidemia    • Hypertension    • Pain in back    • Restless leg syndrome         Social History:    Social History     Socioeconomic History   • Marital status:      Spouse name: Not on file   • Number of children: Not on file   • Years of education: Not on file   • Highest education level: Not on file   Tobacco Use   • Smoking status: Never Smoker   • Smokeless tobacco: Never Used   Substance and Sexual Activity   • Alcohol use: Not Currently   • Drug use: Never   • Sexual activity: Defer       Family History:   Family History   Problem Relation Age of Onset   • Stroke Sister    • Hypertension Neg Hx    • Diabetes Neg Hx    • Cancer Neg Hx        Surgical History:   Past Surgical History:   Procedure Laterality Date   • BLADDER REPAIR     • COLONOSCOPY     • ELBOW PROCEDURE     • FOOT SURGERY     • GALLBLADDER SURGERY     • HYSTERECTOMY     • SHOULDER SURGERY           Current Outpatient Medications:   •  amitriptyline (ELAVIL) 50 MG tablet, , Disp: , Rfl:   •  bumetanide (BUMEX) 2 MG tablet, Take 2 mg by mouth Daily., Disp: , Rfl:   •  buPROPion XL (WELLBUTRIN XL) 300 MG 24 hr tablet, , Disp: , Rfl:   •  carvedilol (COREG) 3.125 MG tablet, , Disp: , Rfl:   •  linaclotide (LINZESS) 145 MCG capsule capsule, Take  by mouth Daily., Disp: , Rfl:   •  Magnesium 500 MG capsule, Take  by mouth., Disp: , Rfl:   •  MULTIPLE VITAMINS-MINERALS PO, Take  by mouth., Disp: , Rfl:    •  Omega-3 Fatty Acids (fish oil) 1000 MG capsule capsule, Take  by mouth., Disp: , Rfl:   •  oxyCODONE-acetaminophen (PERCOCET)  MG per tablet, TK 1 T PO Q 12 H PRN, Disp: , Rfl:   •  rOPINIRole (REQUIP) 3 MG tablet, , Disp: , Rfl:   •  rosuvastatin (CRESTOR) 10 MG tablet, Take 10 mg by mouth Daily., Disp: , Rfl:   •  TURMERIC PO, Take  by mouth., Disp: , Rfl:     Allergies: No Known Allergies     The following portions of the patient's history were reviewed and updated as appropriate: allergies, current medications, past family history, past medical history, past social history, past surgical history and problem list.    Vitals:    09/28/20 1400   BP: 136/82   Pulse: 90   Temp: 97.7 °F (36.5 °C)   SpO2: 96%         09/28/20  1400   Weight: 103 kg (228 lb)     Body mass index is 38.53 kg/m².      PHYSICAL EXAM:  Physical Exam  Vitals signs reviewed.   Constitutional:       Appearance: Normal appearance. She is well-developed.   HENT:      Nose: Nose normal. No nasal deformity.   Eyes:      General: No scleral icterus.  Neck:      Trachea: No tracheal deviation.   Pulmonary:      Effort: Pulmonary effort is normal. No respiratory distress.      Breath sounds: Normal breath sounds.   Abdominal:      General: Bowel sounds are normal. There is no distension.      Palpations: Abdomen is soft. Abdomen is not rigid. There is no shifting dullness.      Tenderness: There is no abdominal tenderness. There is no guarding or rebound.      Hernia: No hernia is present.   Lymphadenopathy:      Comments: No periumbilical lymphadenopathy   Skin:     General: Skin is warm.   Neurological:      Mental Status: She is alert.   Psychiatric:         Behavior: Behavior normal.             Assessment/ Plan  Chula was seen today for constipation and colonoscopy.    Diagnoses and all orders for this visit:    Constipation, unspecified constipation type         No follow-ups on file.    There are no Patient Instructions on file for  this visit.        Discussion:  Patient for consultation regarding worsening constipation.  She is currently taking Senokot as well as Linzess without relief.  We will add Trulance.  We also recommend continued fiber intake.  As for colon cancer screening, we will go and check a virtual colonoscopy or CT colonography as she has had 3 prior attempted colonoscopies which cannot be completed due to her anatomy.  In addition, we have asked for records to be sent here from her surgeon in [place] who performed some type of resection of the lesion on the rectum last year.    Documentation by Orin CHAVEZ acting as a scribe in the following sections on behalf of the billable provider: HPI, ROS, assessment, & plan.     pt is superficially cooperative, is guarded pt denies any hallucinations but appears preoccupied and messages as "thoughts"

## 2020-09-29 DIAGNOSIS — R45.850 HOMICIDAL IDEATIONS: ICD-10-CM

## 2020-09-29 DIAGNOSIS — R55 SYNCOPE AND COLLAPSE: ICD-10-CM

## 2020-09-29 DIAGNOSIS — Z88.0 ALLERGY STATUS TO PENICILLIN: ICD-10-CM

## 2020-09-29 DIAGNOSIS — F31.2 BIPOLAR DISORDER, CURRENT EPISODE MANIC SEVERE WITH PSYCHOTIC FEATURES: ICD-10-CM

## 2020-09-29 DIAGNOSIS — Z88.8 ALLERGY STATUS TO OTHER DRUGS, MEDICAMENTS AND BIOLOGICAL SUBSTANCES: ICD-10-CM

## 2020-09-29 DIAGNOSIS — Z56.0 UNEMPLOYMENT, UNSPECIFIED: ICD-10-CM

## 2020-09-29 DIAGNOSIS — F12.10 CANNABIS ABUSE, UNCOMPLICATED: ICD-10-CM

## 2020-09-29 SDOH — ECONOMIC STABILITY - INCOME SECURITY: UNEMPLOYMENT, UNSPECIFIED: Z56.0

## 2020-10-02 NOTE — PROGRESS NOTE BEHAVIORAL HEALTH - THOUGHT ASSOCIATIONS
Assumed care of pt @0700. Bedside report received. Pt AOX 3. Pt disoriented to time, confused. Pt denies nausea and SOB. Pt on 02 saturating above 90%.  PIV patent with positive blood return. Pt up with 1 p assist and FWW. Last Bm 03/13. Miralax and MOM given.Fall precaution in place. POC discussed with pt, all questions answered at this time. Pt makes needs known, call light within reach, hourly rounding in place.    No Normal

## 2020-11-10 NOTE — PROGRESS NOTE BEHAVIORAL HEALTH - SECONDARY DX1
[Dear  ___] : Dear  [unfilled], [Courtesy Letter:] : I had the pleasure of seeing your patient, [unfilled], in my office today. [Please see my note below.] : Please see my note below. [Consult Closing:] : Thank you very much for allowing me to participate in the care of this patient.  If you have any questions, please do not hesitate to contact me. [Sincerely,] : Sincerely, [DrSudhakar  ___] : Dr. GOLDMAN [FreeTextEntry3] : Javier Sanabria MD, MPH\par Attending Physician\par Hematology Oncology\par [facility]\par [facility]\par  Cannabis abuse

## 2021-07-31 NOTE — PROGRESS NOTE BEHAVIORAL HEALTH - ESTIMATED DISCHARGE DATE
28-Sep-2020
Yes - the patient is able to be screened
28-Sep-2020
29-Sep-2020
29-Sep-2020
28-Sep-2020
29-Sep-2020
28-Sep-2020

## 2021-08-04 NOTE — DISCHARGE NOTE BEHAVIORAL HEALTH - NSBHDCNOCAREGVRFT_PSY_A_CORE
CARDIOLOGY FOLLOW UP - Dr. Best  Date of Service: 8/4/21  CC: denies cp, sob, and palpitations     Review of Systems:  Constitutional: No fever, weight loss, or fatigue  Respiratory: No cough, wheezing, or hemoptysis, no shortness of breath  Cardiovascular: No chest pain, palpitations, passing out, dizziness, or leg swelling  Gastrointestinal: No abd or epigastric pain.  No nausea, vomiting, or hematemesis; no diarrhea or constipation, no melena or hematochezia  Vascular: no edema       PHYSICAL EXAM:  T(C): 37 (08-04-21 @ 09:00), Max: 37.1 (08-03-21 @ 13:00)  HR: 91 (08-04-21 @ 09:00) (77 - 96)  BP: 116/73 (08-04-21 @ 09:00) (98/67 - 116/82)  RR: 18 (08-04-21 @ 09:00) (17 - 18)  SpO2: 96% (08-04-21 @ 09:00) (96% - 100%)  Wt(kg): --  I&O's Summary    03 Aug 2021 07:01  -  04 Aug 2021 07:00  --------------------------------------------------------  IN: 3140 mL / OUT: 5500 mL / NET: -2360 mL        Appearance: Normal	  Cardiovascular: Normal S1 S2,RRR, No JVD, No murmurs  Respiratory: Lungs clear to auscultation	  Gastrointestinal:  Soft, Non-tender, + BS	  Extremities: Normal range of motion, No clubbing, cyanosis or edema      Home Medications:  aspirin 81 mg oral delayed release tablet: 1 tab(s) orally once a day (29 Dec 2020 18:37)  atorvastatin 80 mg oral tablet: 1 tab(s) orally once a day (at bedtime) (29 Dec 2020 18:37)  epoetin martine: injectable once a month (29 Dec 2020 18:37)  ferrous sulfate 325 mg (65 mg elemental iron) oral tablet: 1 tab(s) orally 3 times a day (29 Dec 2020 18:37)  gabapentin 100 mg oral capsule: 1 cap(s) orally once a day (29 Dec 2020 18:37)  nortriptyline 25 mg oral capsule: 1 cap(s) orally once a day (at bedtime) (29 Dec 2020 18:37)  pantoprazole 40 mg oral delayed release tablet: 1 tab(s) orally once a day (before a meal) (29 Dec 2020 18:37)  Toujeo SoloStar 300 units/mL subcutaneous solution: 60 unit(s) subcutaneous once a day (at bedtime) (08 Jan 2021 12:41)      MEDICATIONS  (STANDING):  aspirin enteric coated 81 milliGRAM(s) Oral daily  atorvastatin 80 milliGRAM(s) Oral at bedtime  cadexomer iodine 0.9% Gel 1 Application(s) Topical daily  chlorhexidine 0.12% Liquid 15 milliLiter(s) Oral Mucosa two times a day  chlorhexidine 2% Cloths 1 Application(s) Topical <User Schedule>  chlorhexidine 2% Cloths 1 Application(s) Topical daily  dextrose 40% Gel 15 Gram(s) Oral once  dextrose 5%. 1000 milliLiter(s) (50 mL/Hr) IV Continuous <Continuous>  dextrose 5%. 1000 milliLiter(s) (100 mL/Hr) IV Continuous <Continuous>  dextrose 50% Injectable 25 Gram(s) IV Push once  dextrose 50% Injectable 12.5 Gram(s) IV Push once  dextrose 50% Injectable 25 Gram(s) IV Push once  dronabinol 5 milliGRAM(s) Oral two times a day  ferrous    sulfate 325 milliGRAM(s) Oral three times a day  folic acid 1 milliGRAM(s) Oral daily  gentamicin 0.1% Ointment 1 Application(s) Topical <User Schedule>  glucagon  Injectable 1 milliGRAM(s) IntraMuscular once  heparin   Injectable 5000 Unit(s) SubCutaneous every 12 hours  insulin glargine Injectable (LANTUS) 5 Unit(s) SubCutaneous at bedtime  insulin lispro (ADMELOG) corrective regimen sliding scale   SubCutaneous three times a day before meals  insulin lispro (ADMELOG) corrective regimen sliding scale   SubCutaneous at bedtime  lidocaine   4% Patch 1 Patch Transdermal daily  lidocaine   4% Patch 1 Patch Transdermal daily  melatonin 5 milliGRAM(s) Oral at bedtime  midodrine 30 milliGRAM(s) Oral every 8 hours  Nephro-kristi 1 Tablet(s) Oral daily  pantoprazole  Injectable 40 milliGRAM(s) IV Push daily  sevelamer carbonate 1600 milliGRAM(s) Oral three times a day  sodium chloride 0.9%. 1000 milliLiter(s) (50 mL/Hr) IV Continuous <Continuous>  thiamine IVPB 500 milliGRAM(s) IV Intermittent daily  ticagrelor 60 milliGRAM(s) Oral every 12 hours      TELEMETRY: 	    ECG:  	  RADIOLOGY:   DIAGNOSTIC TESTING:  [ ] Echocardiogram:  [ ]  Catheterization:  [ ] Stress Test:    OTHER: 	    LABS:	 	                            12.0   9.85  )-----------( 236      ( 04 Aug 2021 06:17 )             39.9     08-04    133<L>  |  95<L>  |  52<H>  ----------------------------<  106<H>  3.8   |  25  |  9.59<H>    Ca    8.5      04 Aug 2021 06:16               Pt declined

## 2021-09-22 NOTE — PROGRESS NOTE BEHAVIORAL HEALTH - NSBHFUPDXUPDATE_PSY_A_CORE
Noted. Patient on dialysis, monitored closely by Nephrology
[facility] called with a critically high creatinine level for the patient of 7.5.  (Call came in at 9:15 am 9/22/21) they are faxing labs to the office.  
no

## 2021-10-04 ENCOUNTER — EMERGENCY (EMERGENCY)
Facility: HOSPITAL | Age: 36
LOS: 1 days | Discharge: LEFT BEFORE DISCHARGE | End: 2021-10-04
Attending: EMERGENCY MEDICINE
Payer: COMMERCIAL

## 2021-10-04 VITALS
OXYGEN SATURATION: 95 % | RESPIRATION RATE: 20 BRPM | HEART RATE: 117 BPM | HEIGHT: 68 IN | TEMPERATURE: 99 F | SYSTOLIC BLOOD PRESSURE: 103 MMHG | DIASTOLIC BLOOD PRESSURE: 69 MMHG

## 2021-10-04 PROCEDURE — 72125 CT NECK SPINE W/O DYE: CPT | Mod: MC

## 2021-10-04 PROCEDURE — 99284 EMERGENCY DEPT VISIT MOD MDM: CPT | Mod: 25

## 2021-10-04 PROCEDURE — 70486 CT MAXILLOFACIAL W/O DYE: CPT | Mod: MA

## 2021-10-04 PROCEDURE — 70450 CT HEAD/BRAIN W/O DYE: CPT | Mod: 26,MC

## 2021-10-04 PROCEDURE — 72125 CT NECK SPINE W/O DYE: CPT | Mod: 26,MC

## 2021-10-04 PROCEDURE — 70486 CT MAXILLOFACIAL W/O DYE: CPT | Mod: 26,MA

## 2021-10-04 PROCEDURE — 70450 CT HEAD/BRAIN W/O DYE: CPT | Mod: MC

## 2021-10-04 PROCEDURE — 99285 EMERGENCY DEPT VISIT HI MDM: CPT

## 2021-10-04 RX ORDER — TETANUS TOXOID, REDUCED DIPHTHERIA TOXOID AND ACELLULAR PERTUSSIS VACCINE, ADSORBED 5; 2.5; 8; 8; 2.5 [IU]/.5ML; [IU]/.5ML; UG/.5ML; UG/.5ML; UG/.5ML
0.5 SUSPENSION INTRAMUSCULAR ONCE
Refills: 0 | Status: DISCONTINUED | OUTPATIENT
Start: 2021-10-04 | End: 2021-10-09
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
## 2021-10-04 NOTE — ED PROVIDER NOTE - PROGRESS NOTE DETAILS
patient became upset after misplacing keys, was walking around ED searching, tried to redirect and assist patient in search and explain patient to get Xray and lac repair and we will look for his keys. patient states he doesn't want medical attention and then ran out the front door. tried to call number in chart but not accurate -Noemi DO
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
## 2021-10-04 NOTE — ED PROVIDER NOTE - NSICDXPASTMEDICALHX_GEN_ALL_CORE_FT
-Wear sling morning and night only removing to shower. You may remove the splint to perform range of motion exercises up to 6 times daily performing gentle flexion and extension of the elbow and trying a small circles with the elbow or writing the alphabet with the elbow to prevent the shoulder from stiffening.  -You may take alternating ibuprofen and Tylenol every 6 hours as needed for any pain or discomfort.  -Apply ice every 2-3 hours for 15-20 minutes at a time for the first 48 hours. After 48 hours you may use ice or heat whichever is more comfortable.  -A referral to orthopedics has been placed. They should contact you within the next week to schedule a follow-up appointment or you may contact their office tomorrow to schedule an appointment within the next week.  -You may not play any contact sports or any strenuous activities using the right arm until cleared by orthopedics.  -Return to the emergency department with new or worsening symptoms such as increasing pain or swelling, weakness, numbness, or color changes in the hand (blue or white).  Broken Collarbone (Child)  Your child has a broken collarbone (fractured clavicle). The collarbone connects the breast bone to the shoulder. This injury may cause pain, swelling, bruising, and a bump (deformity) around the break. A more serious collarbone break may harm nerves and blood vessels in the area, as well as the lungs.  Children can break their collarbone by falling on a shoulder. Infants can break their collarbone during delivery. This may happen because of greater than normal birth weight.  A broken collarbone is usually diagnosed by an X-ray.  But the break may not show up on the first X-rays done, especially in children. Your child may need follow-up X-rays if the break can’t be seen. These are usually done in 10 to 14 days. At that time, they may show that the break is healing.  A broken collarbone is usually treated with a shoulder immobilizer or  sling. Younger children often need to keep the shoulder in the immobilizer for 2 to 4 weeks. Adolescents typically need to keep the shoulder immobilized for 4 to 8 weeks. Your child will need to start range-of-motion exercises when the pain from the injury eases. Only rarely is surgery needed for a broken collarbone.  Even after the break heals, your child may have a bump at the site of the fracture.  This may get smaller over the next 6 to 9 months. But sometimes the bump never goes away.   Your child’s healthcare provider will tell you when your child can go back to playing contact sports. At that point, your child should no longer have any pain when moving the shoulder. He or she should also have regained shoulder strength. This usually takes 6 to 8 weeks.  Home care  Your child’s healthcare provider may prescribe medicines for pain. Follow the provider’s instructions for giving these medicines to your child. Don’t give your child aspirin unless the provider tells you to.  General care  · Put an ice pack on the injured area. Do this for 20 minutes every 1 to 2 hours the first day for pain relief. You can make an ice pack by wrapping a plastic bag of ice cubes in a thin towel. Don’t put the ice directly on the skin, because this can cause damage. Continue using the ice pack 3 to 4 times a day for the next 2 days. Then use the ice pack as needed to ease pain and swelling. You can put the cold pack directly on the shoulder immobilizer or sling.  · If your child has a sling, he or she can take it off for bathing and sleeping.  · Your child should avoid raising the injured arm overhead until he or she can do this without pain.  · Encourage your child to wiggle or exercise the fingers of the hand on the injured side often.  Follow-up care  Follow up with your child’s healthcare provider, or as advised. Your child may need follow-up X-rays to see how the bone is healing. If you were referred to a specialist, make that  appointment as soon as you can.  Special note to parents  Healthcare providers are trained to recognize injuries like this one in young children as a sign of possible abuse. Several healthcare providers may ask questions about how your child was injured. Healthcare providers are required by law to ask you these questions. This is done for protection of the child. Please try to be patient and not take offense.  Call 911  Call 911 if any of these occur:  · Trouble breathing  · Confusion  · Very drowsy or trouble awakening  · Fainting or loss of consciousness  · Rapid heart rate  · Seizure  · Stiff neck  When to seek medical advice  Call your child's healthcare provider right away if any of these occur:  · Area of bruising over the collarbone gets larger  · Hand or fingers of the affected arm on the injured side become swollen, numb, cold, burning, or blue  · Pain or swelling gets worse. Babies too young to talk may show pain with crying that can't be soothed.  · Your child can’t move the fingers of the hand of the injured collarbone  · Tingling in the fingers of the hand of the injured collarbone that is new or getting worse  · Fever (see Fever and children, below)  Fever and children  Always use a digital thermometer to check your child’s temperature. Never use a mercury thermometer.  For infants and toddlers, be sure to use a rectal thermometer correctly. A rectal thermometer may accidentally poke a hole in (perforate) the rectum. It may also pass on germs from the stool. Always follow the product maker’s directions for proper use. If you don’t feel comfortable taking a rectal temperature, use another method. When you talk to your child’s healthcare provider, tell him or her which method you used to take your child’s temperature.  Here are guidelines for fever temperature. Ear temperatures aren’t accurate before 6 months of age. Don’t take an oral temperature until your child is at least 4 years old.  Infant under 3  months old:  · Ask your child’s healthcare provider how you should take the temperature.  · Rectal or forehead (temporal artery) temperature of 100.4°F (38°C) or higher, or as directed by the provider  · Armpit temperature of 99°F (37.2°C) or higher, or as directed by the provider  Child age 3 to 36 months:  · Rectal, forehead (temporal artery), or ear temperature of 102°F (38.9°C) or higher, or as directed by the provider  · Armpit temperature of 101°F (38.3°C) or higher, or as directed by the provider  Child of any age:  · Repeated temperature of 104°F (40°C) or higher, or as directed by the provider  · Fever that lasts more than 24 hours in a child under 2 years old. Or a fever that lasts for 3 days in a child 2 years or older.   Date Last Reviewed: 2/1/2017  © 4413-1449 [employer] [address] [place] [address], [place] [address]. All rights reserved. This information is not intended as a substitute for professional medical care. Always follow your healthcare professional's instructions.         PAST MEDICAL HISTORY:  No pertinent past medical history

## 2021-10-04 NOTE — ED PROVIDER NOTE - NS ED ROS FT
ROS: no CP/SOB. no cough. no fever. no n/v/d/c. no abd pain. no rash. +bleeding. no urinary complaints. no weakness. no vision changes. +HA. +back pain. no extremity swelling/deformity. No change in mental status.

## 2021-10-04 NOTE — ED PROVIDER NOTE - PHYSICAL EXAMINATION
Gen: NAD, AOx3, GCS 14 1 off for eyes  Head: NC, +hematoma midline frontal with laceration stellat ~ 7cm, also hematoma top of occiput  HEENT: EOMI, oral mucosa moist, normal conjunctiva, neck supple  Lung: no respiratory distress  CV:  Normal perfusion  Abd: soft, NTND  MSK: No edema, no visible deformities, no cervical spine/thoracic/lumbar ttp, +ttp left posterior rib cage with deformity or bruising   Neuro: No focal neurologic deficits  Skin: see head   Psych: normal affect

## 2021-10-05 ENCOUNTER — INPATIENT (INPATIENT)
Facility: HOSPITAL | Age: 36
LOS: 9 days | Discharge: HOME | DRG: 964 | End: 2021-10-15
Attending: SURGERY | Admitting: SURGERY
Payer: COMMERCIAL

## 2021-10-05 VITALS
HEART RATE: 92 BPM | RESPIRATION RATE: 18 BRPM | HEIGHT: 68 IN | TEMPERATURE: 99 F | OXYGEN SATURATION: 97 % | DIASTOLIC BLOOD PRESSURE: 58 MMHG | SYSTOLIC BLOOD PRESSURE: 107 MMHG | WEIGHT: 179.9 LBS

## 2021-10-05 LAB
ANION GAP SERPL CALC-SCNC: 15 MMOL/L — SIGNIFICANT CHANGE UP (ref 5–17)
APAP SERPL-MCNC: <3 UG/ML — LOW (ref 10–26)
BASOPHILS # BLD AUTO: 0 K/UL — SIGNIFICANT CHANGE UP (ref 0–0.2)
BASOPHILS NFR BLD AUTO: 0 % — SIGNIFICANT CHANGE UP (ref 0–2)
BUN SERPL-MCNC: 19.4 MG/DL — SIGNIFICANT CHANGE UP (ref 8–20)
CALCIUM SERPL-MCNC: 9.3 MG/DL — SIGNIFICANT CHANGE UP (ref 8.6–10.2)
CHLORIDE SERPL-SCNC: 100 MMOL/L — SIGNIFICANT CHANGE UP (ref 98–107)
CO2 SERPL-SCNC: 22 MMOL/L — SIGNIFICANT CHANGE UP (ref 22–29)
CREAT SERPL-MCNC: 1.18 MG/DL — SIGNIFICANT CHANGE UP (ref 0.5–1.3)
EOSINOPHIL # BLD AUTO: 0 K/UL — SIGNIFICANT CHANGE UP (ref 0–0.5)
EOSINOPHIL NFR BLD AUTO: 0 % — SIGNIFICANT CHANGE UP (ref 0–6)
ETHANOL SERPL-MCNC: <10 MG/DL — SIGNIFICANT CHANGE UP (ref 0–9)
GLUCOSE SERPL-MCNC: 133 MG/DL — HIGH (ref 70–99)
HADV DNA SPEC QL NAA+PROBE: DETECTED
HCT VFR BLD CALC: 32.3 % — LOW (ref 39–50)
HGB BLD-MCNC: 11.1 G/DL — LOW (ref 13–17)
LYMPHOCYTES # BLD AUTO: 0.78 K/UL — LOW (ref 1–3.3)
LYMPHOCYTES # BLD AUTO: 4.3 % — LOW (ref 13–44)
MANUAL SMEAR VERIFICATION: SIGNIFICANT CHANGE UP
MCHC RBC-ENTMCNC: 30.7 PG — SIGNIFICANT CHANGE UP (ref 27–34)
MCHC RBC-ENTMCNC: 34.4 GM/DL — SIGNIFICANT CHANGE UP (ref 32–36)
MCV RBC AUTO: 89.5 FL — SIGNIFICANT CHANGE UP (ref 80–100)
MONOCYTES # BLD AUTO: 2.22 K/UL — HIGH (ref 0–0.9)
MONOCYTES NFR BLD AUTO: 12.2 % — SIGNIFICANT CHANGE UP (ref 2–14)
NEUTROPHILS # BLD AUTO: 15.19 K/UL — HIGH (ref 1.8–7.4)
NEUTROPHILS NFR BLD AUTO: 83.5 % — HIGH (ref 43–77)
PLAT MORPH BLD: NORMAL — SIGNIFICANT CHANGE UP
PLATELET # BLD AUTO: 262 K/UL — SIGNIFICANT CHANGE UP (ref 150–400)
POTASSIUM SERPL-MCNC: 4.6 MMOL/L — SIGNIFICANT CHANGE UP (ref 3.5–5.3)
POTASSIUM SERPL-SCNC: 4.6 MMOL/L — SIGNIFICANT CHANGE UP (ref 3.5–5.3)
RAPID RVP RESULT: DETECTED
RBC # BLD: 3.61 M/UL — LOW (ref 4.2–5.8)
RBC # FLD: 13.2 % — SIGNIFICANT CHANGE UP (ref 10.3–14.5)
RBC BLD AUTO: NORMAL — SIGNIFICANT CHANGE UP
SALICYLATES SERPL-MCNC: <0.6 MG/DL — LOW (ref 10–20)
SARS-COV-2 RNA SPEC QL NAA+PROBE: SIGNIFICANT CHANGE UP
SODIUM SERPL-SCNC: 137 MMOL/L — SIGNIFICANT CHANGE UP (ref 135–145)
WBC # BLD: 18.19 K/UL — HIGH (ref 3.8–10.5)
WBC # FLD AUTO: 18.19 K/UL — HIGH (ref 3.8–10.5)

## 2021-10-05 PROCEDURE — 99285 EMERGENCY DEPT VISIT HI MDM: CPT

## 2021-10-05 PROCEDURE — 93010 ELECTROCARDIOGRAM REPORT: CPT

## 2021-10-05 RX ORDER — ACETAMINOPHEN 500 MG
650 TABLET ORAL ONCE
Refills: 0 | Status: COMPLETED | OUTPATIENT
Start: 2021-10-05 | End: 2021-10-06

## 2021-10-05 RX ORDER — CEPHALEXIN 500 MG
500 CAPSULE ORAL ONCE
Refills: 0 | Status: COMPLETED | OUTPATIENT
Start: 2021-10-05 | End: 2021-10-05

## 2021-10-05 RX ORDER — DIPHENHYDRAMINE HCL 50 MG
50 CAPSULE ORAL EVERY 6 HOURS
Refills: 0 | Status: DISCONTINUED | OUTPATIENT
Start: 2021-10-05 | End: 2021-10-15

## 2021-10-05 RX ORDER — HALOPERIDOL DECANOATE 100 MG/ML
7.5 INJECTION INTRAMUSCULAR EVERY 6 HOURS
Refills: 0 | Status: DISCONTINUED | OUTPATIENT
Start: 2021-10-05 | End: 2021-10-15

## 2021-10-05 RX ORDER — HALOPERIDOL DECANOATE 100 MG/ML
5 INJECTION INTRAMUSCULAR EVERY 6 HOURS
Refills: 0 | Status: DISCONTINUED | OUTPATIENT
Start: 2021-10-05 | End: 2021-10-05

## 2021-10-05 RX ADMIN — Medication 500 MILLIGRAM(S): at 20:49

## 2021-10-05 NOTE — ED ADULT NURSE REASSESSMENT NOTE - NS ED NURSE REASSESS COMMENT FT1
pt calm and cooperative at this time .  meal tray provided at pt request. orders for abx noted and given. pt requesting pasin med called placed to dr law awaiting result call
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
## 2021-10-05 NOTE — ED PROVIDER NOTE - PHYSICAL EXAMINATION
Const: Awake, alert and oriented. In no acute distress. Well appearing.  HEENT: NC/AT. Moist mucous membranes.  Eyes: No scleral icterus. EOMI.  Neck:. Soft and supple. Full ROM without pain.  Cardiac: +S1/S2. No murmurs. Peripheral pulses 2+ and symmetric. No LE edema.  Resp: Speaking in full sentences. Left lower rib tenderness on palpation, No evidence of respiratory distress. No wheezes, rales or rhonchi.  Abd: Soft, LUQ and RUQ tenderness on palpation, non-distended. Normal bowel sounds in all 4 quadrants. No guarding or rebound.  MSK: Tenderness over left shoulder on palpation FROM in extremities neurovasculary intact radial pulse 2+ hand  5/5   Back: Spine midline and non-tender. No CVAT.  Skin: Laceration dried noted to forehead 2.0 cm    Lymph: No cervical lymphadenopathy.  Neuro: Awake, alert & oriented x 3. CN II-XII intact Moves all extremities symmetrically.

## 2021-10-05 NOTE — ED ADULT NURSE NOTE - NSIMPLEMENTINTERV_GEN_ALL_ED
Implemented All Fall Risk Interventions:  [place] to call system. Call bell, personal items and telephone within reach. Instruct patient to call for assistance. Room bathroom lighting operational. Non-slip footwear when patient is off stretcher. Physically safe environment: no spills, clutter or unnecessary equipment. Stretcher in lowest position, wheels locked, appropriate side rails in place. Provide visual cue, wrist band, yellow gown, etc. Monitor gait and stability. Monitor for mental status changes and reorient to person, place, and time. Review medications for side effects contributing to fall risk. Reinforce activity limits and safety measures with patient and family.
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
## 2021-10-05 NOTE — ED BEHAVIORAL HEALTH ASSESSMENT NOTE - VIOLENCE RISK FACTORS:
Violent ideation/threat/speech/Substance abuse/Impulsivity/Lack of insight into violence risk/need for treatment/Noncompliance with treatment

## 2021-10-05 NOTE — ED PROVIDER NOTE - CARE PLAN
1 Principal Discharge DX:	Homicidal ideation   Principal Discharge DX:	Rib fractures  Secondary Diagnosis:	Liver laceration  Secondary Diagnosis:	Homicidal ideation

## 2021-10-05 NOTE — ED BEHAVIORAL HEALTH ASSESSMENT NOTE - DESCRIPTION
Vital Signs Last 24 Hrs  T(C): 37.7 (05 Oct 2021 19:34), Max: 37.7 (05 Oct 2021 19:34)  T(F): 99.8 (05 Oct 2021 19:34), Max: 99.8 (05 Oct 2021 19:34)  HR: 116 (05 Oct 2021 19:34) (92 - 116)  BP: 112/73 (05 Oct 2021 19:34) (107/58 - 112/73)  BP(mean): --  RR: 18 (05 Oct 2021 19:34) (18 - 18)  SpO2: 95% (05 Oct 2021 19:34) (95% - 97%) denies see above
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
## 2021-10-05 NOTE — ED PROVIDER NOTE - PROGRESS NOTE DETAILS
medical necessity pt needs ct scans due to trauma pscyh will be admitted to inpatient - v obs psych pt needs inpatient psych  radiologist called regarding ct scans trauma consult placed will admit to trauma

## 2021-10-05 NOTE — CHART NOTE - NSCHARTNOTEFT_GEN_A_CORE
SW Note: SW made aware pt is in need of inpt psych trx on involuntary basis, pt's covid result and some xrays still pending. SW spoke to [facility] admissions ([facility]) they can begin review of pt and determine if they can accept either overnight or in AM, SW following
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
## 2021-10-06 DIAGNOSIS — F31.2 BIPOLAR DISORDER, CURRENT EPISODE MANIC SEVERE WITH PSYCHOTIC FEATURES: ICD-10-CM

## 2021-10-06 LAB
ALBUMIN SERPL ELPH-MCNC: 4 G/DL — SIGNIFICANT CHANGE UP (ref 3.3–5.2)
ALP SERPL-CCNC: 66 U/L — SIGNIFICANT CHANGE UP (ref 40–120)
ALT FLD-CCNC: 49 U/L — HIGH
AMPHET UR-MCNC: NEGATIVE — SIGNIFICANT CHANGE UP
ANION GAP SERPL CALC-SCNC: 15 MMOL/L — SIGNIFICANT CHANGE UP (ref 5–17)
APPEARANCE UR: CLEAR — SIGNIFICANT CHANGE UP
AST SERPL-CCNC: 141 U/L — HIGH
BACTERIA # UR AUTO: ABNORMAL
BARBITURATES UR SCN-MCNC: NEGATIVE — SIGNIFICANT CHANGE UP
BASOPHILS # BLD AUTO: 0.02 K/UL — SIGNIFICANT CHANGE UP (ref 0–0.2)
BASOPHILS NFR BLD AUTO: 0.1 % — SIGNIFICANT CHANGE UP (ref 0–2)
BENZODIAZ UR-MCNC: NEGATIVE — SIGNIFICANT CHANGE UP
BILIRUB SERPL-MCNC: 0.7 MG/DL — SIGNIFICANT CHANGE UP (ref 0.4–2)
BILIRUB UR-MCNC: NEGATIVE — SIGNIFICANT CHANGE UP
BUN SERPL-MCNC: 18 MG/DL — SIGNIFICANT CHANGE UP (ref 8–20)
CALCIUM SERPL-MCNC: 9.1 MG/DL — SIGNIFICANT CHANGE UP (ref 8.6–10.2)
CHLORIDE SERPL-SCNC: 99 MMOL/L — SIGNIFICANT CHANGE UP (ref 98–107)
CO2 SERPL-SCNC: 23 MMOL/L — SIGNIFICANT CHANGE UP (ref 22–29)
COCAINE METAB.OTHER UR-MCNC: NEGATIVE — SIGNIFICANT CHANGE UP
COLOR SPEC: YELLOW — SIGNIFICANT CHANGE UP
CREAT SERPL-MCNC: 0.91 MG/DL — SIGNIFICANT CHANGE UP (ref 0.5–1.3)
DIFF PNL FLD: ABNORMAL
EOSINOPHIL # BLD AUTO: 0 K/UL — SIGNIFICANT CHANGE UP (ref 0–0.5)
EOSINOPHIL NFR BLD AUTO: 0 % — SIGNIFICANT CHANGE UP (ref 0–6)
EPI CELLS # UR: SIGNIFICANT CHANGE UP
GLUCOSE SERPL-MCNC: 110 MG/DL — HIGH (ref 70–99)
GLUCOSE UR QL: NEGATIVE MG/DL — SIGNIFICANT CHANGE UP
HCT VFR BLD CALC: 27.1 % — LOW (ref 39–50)
HCT VFR BLD CALC: 28.4 % — LOW (ref 39–50)
HGB BLD-MCNC: 9.1 G/DL — LOW (ref 13–17)
HGB BLD-MCNC: 9.5 G/DL — LOW (ref 13–17)
IMM GRANULOCYTES NFR BLD AUTO: 0.6 % — SIGNIFICANT CHANGE UP (ref 0–1.5)
KETONES UR-MCNC: ABNORMAL
LEUKOCYTE ESTERASE UR-ACNC: NEGATIVE — SIGNIFICANT CHANGE UP
LYMPHOCYTES # BLD AUTO: 1.91 K/UL — SIGNIFICANT CHANGE UP (ref 1–3.3)
LYMPHOCYTES # BLD AUTO: 13.2 % — SIGNIFICANT CHANGE UP (ref 13–44)
MCHC RBC-ENTMCNC: 29.8 PG — SIGNIFICANT CHANGE UP (ref 27–34)
MCHC RBC-ENTMCNC: 30.3 PG — SIGNIFICANT CHANGE UP (ref 27–34)
MCHC RBC-ENTMCNC: 33.5 GM/DL — SIGNIFICANT CHANGE UP (ref 32–36)
MCHC RBC-ENTMCNC: 33.6 GM/DL — SIGNIFICANT CHANGE UP (ref 32–36)
MCV RBC AUTO: 89 FL — SIGNIFICANT CHANGE UP (ref 80–100)
MCV RBC AUTO: 90.3 FL — SIGNIFICANT CHANGE UP (ref 80–100)
METHADONE UR-MCNC: NEGATIVE — SIGNIFICANT CHANGE UP
MONOCYTES # BLD AUTO: 1.52 K/UL — HIGH (ref 0–0.9)
MONOCYTES NFR BLD AUTO: 10.5 % — SIGNIFICANT CHANGE UP (ref 2–14)
NEUTROPHILS # BLD AUTO: 10.95 K/UL — HIGH (ref 1.8–7.4)
NEUTROPHILS NFR BLD AUTO: 75.6 % — SIGNIFICANT CHANGE UP (ref 43–77)
NITRITE UR-MCNC: NEGATIVE — SIGNIFICANT CHANGE UP
OPIATES UR-MCNC: NEGATIVE — SIGNIFICANT CHANGE UP
PCP SPEC-MCNC: SIGNIFICANT CHANGE UP
PCP UR-MCNC: NEGATIVE — SIGNIFICANT CHANGE UP
PH UR: 6 — SIGNIFICANT CHANGE UP (ref 5–8)
PLATELET # BLD AUTO: 202 K/UL — SIGNIFICANT CHANGE UP (ref 150–400)
PLATELET # BLD AUTO: 218 K/UL — SIGNIFICANT CHANGE UP (ref 150–400)
POTASSIUM SERPL-MCNC: 4.1 MMOL/L — SIGNIFICANT CHANGE UP (ref 3.5–5.3)
POTASSIUM SERPL-SCNC: 4.1 MMOL/L — SIGNIFICANT CHANGE UP (ref 3.5–5.3)
PROT SERPL-MCNC: 5.9 G/DL — LOW (ref 6.6–8.7)
PROT UR-MCNC: 30 MG/DL
RBC # BLD: 3 M/UL — LOW (ref 4.2–5.8)
RBC # BLD: 3.19 M/UL — LOW (ref 4.2–5.8)
RBC # FLD: 13.2 % — SIGNIFICANT CHANGE UP (ref 10.3–14.5)
RBC # FLD: 13.2 % — SIGNIFICANT CHANGE UP (ref 10.3–14.5)
RBC CASTS # UR COMP ASSIST: ABNORMAL /HPF (ref 0–4)
SODIUM SERPL-SCNC: 137 MMOL/L — SIGNIFICANT CHANGE UP (ref 135–145)
SP GR SPEC: 1.01 — SIGNIFICANT CHANGE UP (ref 1.01–1.02)
THC UR QL: POSITIVE
UROBILINOGEN FLD QL: NEGATIVE MG/DL — SIGNIFICANT CHANGE UP
WBC # BLD: 11.14 K/UL — HIGH (ref 3.8–10.5)
WBC # BLD: 14.49 K/UL — HIGH (ref 3.8–10.5)
WBC # FLD AUTO: 11.14 K/UL — HIGH (ref 3.8–10.5)
WBC # FLD AUTO: 14.49 K/UL — HIGH (ref 3.8–10.5)
WBC UR QL: SIGNIFICANT CHANGE UP

## 2021-10-06 PROCEDURE — 99222 1ST HOSP IP/OBS MODERATE 55: CPT | Mod: GC

## 2021-10-06 PROCEDURE — 71045 X-RAY EXAM CHEST 1 VIEW: CPT | Mod: 26

## 2021-10-06 PROCEDURE — 73030 X-RAY EXAM OF SHOULDER: CPT | Mod: 26,LT

## 2021-10-06 PROCEDURE — 74177 CT ABD & PELVIS W/CONTRAST: CPT | Mod: 26,MB

## 2021-10-06 PROCEDURE — 71260 CT THORAX DX C+: CPT | Mod: 26,MB

## 2021-10-06 RX ORDER — FENTANYL CITRATE 50 UG/ML
25 INJECTION INTRAVENOUS ONCE
Refills: 0 | Status: DISCONTINUED | OUTPATIENT
Start: 2021-10-06 | End: 2021-10-06

## 2021-10-06 RX ORDER — HYDROMORPHONE HYDROCHLORIDE 2 MG/ML
0.5 INJECTION INTRAMUSCULAR; INTRAVENOUS; SUBCUTANEOUS EVERY 4 HOURS
Refills: 0 | Status: DISCONTINUED | OUTPATIENT
Start: 2021-10-06 | End: 2021-10-08

## 2021-10-06 RX ORDER — ACETAMINOPHEN 500 MG
975 TABLET ORAL EVERY 6 HOURS
Refills: 0 | Status: DISCONTINUED | OUTPATIENT
Start: 2021-10-06 | End: 2021-10-07

## 2021-10-06 RX ORDER — LIDOCAINE HCL 20 MG/ML
20 VIAL (ML) INJECTION ONCE
Refills: 0 | Status: DISCONTINUED | OUTPATIENT
Start: 2021-10-06 | End: 2021-10-07

## 2021-10-06 RX ORDER — CHLORHEXIDINE GLUCONATE 213 G/1000ML
1 SOLUTION TOPICAL
Refills: 0 | Status: DISCONTINUED | OUTPATIENT
Start: 2021-10-06 | End: 2021-10-10

## 2021-10-06 RX ORDER — OXYCODONE HYDROCHLORIDE 5 MG/1
5 TABLET ORAL EVERY 4 HOURS
Refills: 0 | Status: DISCONTINUED | OUTPATIENT
Start: 2021-10-06 | End: 2021-10-07

## 2021-10-06 RX ORDER — INFLUENZA VIRUS VACCINE 15; 15; 15; 15 UG/.5ML; UG/.5ML; UG/.5ML; UG/.5ML
0.5 SUSPENSION INTRAMUSCULAR ONCE
Refills: 0 | Status: DISCONTINUED | OUTPATIENT
Start: 2021-10-06 | End: 2021-10-15

## 2021-10-06 RX ORDER — BACITRACIN ZINC 500 UNIT/G
1 OINTMENT IN PACKET (EA) TOPICAL
Refills: 0 | Status: DISCONTINUED | OUTPATIENT
Start: 2021-10-06 | End: 2021-10-15

## 2021-10-06 RX ORDER — ONDANSETRON 8 MG/1
4 TABLET, FILM COATED ORAL EVERY 6 HOURS
Refills: 0 | Status: DISCONTINUED | OUTPATIENT
Start: 2021-10-06 | End: 2021-10-08

## 2021-10-06 RX ORDER — LIDOCAINE 4 G/100G
2 CREAM TOPICAL DAILY
Refills: 0 | Status: DISCONTINUED | OUTPATIENT
Start: 2021-10-06 | End: 2021-10-15

## 2021-10-06 RX ORDER — SODIUM CHLORIDE 9 MG/ML
1000 INJECTION, SOLUTION INTRAVENOUS
Refills: 0 | Status: DISCONTINUED | OUTPATIENT
Start: 2021-10-06 | End: 2021-10-07

## 2021-10-06 RX ORDER — ENOXAPARIN SODIUM 100 MG/ML
30 INJECTION SUBCUTANEOUS EVERY 12 HOURS
Refills: 0 | Status: DISCONTINUED | OUTPATIENT
Start: 2021-10-06 | End: 2021-10-06

## 2021-10-06 RX ORDER — CEFAZOLIN SODIUM 1 G
2000 VIAL (EA) INJECTION ONCE
Refills: 0 | Status: DISCONTINUED | OUTPATIENT
Start: 2021-10-06 | End: 2021-10-11

## 2021-10-06 RX ORDER — OXYCODONE HYDROCHLORIDE 5 MG/1
10 TABLET ORAL EVERY 4 HOURS
Refills: 0 | Status: DISCONTINUED | OUTPATIENT
Start: 2021-10-06 | End: 2021-10-11

## 2021-10-06 RX ORDER — HALOPERIDOL DECANOATE 100 MG/ML
5 INJECTION INTRAMUSCULAR ONCE
Refills: 0 | Status: DISCONTINUED | OUTPATIENT
Start: 2021-10-06 | End: 2021-10-06

## 2021-10-06 RX ORDER — FENTANYL CITRATE 50 UG/ML
50 INJECTION INTRAVENOUS ONCE
Refills: 0 | Status: DISCONTINUED | OUTPATIENT
Start: 2021-10-06 | End: 2021-10-06

## 2021-10-06 RX ORDER — DEXMEDETOMIDINE HYDROCHLORIDE IN 0.9% SODIUM CHLORIDE 4 UG/ML
0.2 INJECTION INTRAVENOUS
Qty: 200 | Refills: 0 | Status: DISCONTINUED | OUTPATIENT
Start: 2021-10-06 | End: 2021-10-07

## 2021-10-06 RX ORDER — CHLORHEXIDINE GLUCONATE 213 G/1000ML
1 SOLUTION TOPICAL
Refills: 0 | Status: DISCONTINUED | OUTPATIENT
Start: 2021-10-06 | End: 2021-10-06

## 2021-10-06 RX ORDER — SODIUM CHLORIDE 9 MG/ML
1000 INJECTION, SOLUTION INTRAVENOUS
Refills: 0 | Status: DISCONTINUED | OUTPATIENT
Start: 2021-10-06 | End: 2021-10-06

## 2021-10-06 RX ORDER — HALOPERIDOL DECANOATE 100 MG/ML
7.5 INJECTION INTRAMUSCULAR ONCE
Refills: 0 | Status: COMPLETED | OUTPATIENT
Start: 2021-10-06 | End: 2021-10-06

## 2021-10-06 RX ADMIN — OXYCODONE HYDROCHLORIDE 5 MILLIGRAM(S): 5 TABLET ORAL at 22:30

## 2021-10-06 RX ADMIN — FENTANYL CITRATE 50 MICROGRAM(S): 50 INJECTION INTRAVENOUS at 03:30

## 2021-10-06 RX ADMIN — OXYCODONE HYDROCHLORIDE 5 MILLIGRAM(S): 5 TABLET ORAL at 21:44

## 2021-10-06 RX ADMIN — OXYCODONE HYDROCHLORIDE 5 MILLIGRAM(S): 5 TABLET ORAL at 06:21

## 2021-10-06 RX ADMIN — Medication 1 APPLICATION(S): at 18:36

## 2021-10-06 RX ADMIN — DEXMEDETOMIDINE HYDROCHLORIDE IN 0.9% SODIUM CHLORIDE 3.7 MICROGRAM(S)/KG/HR: 4 INJECTION INTRAVENOUS at 08:21

## 2021-10-06 RX ADMIN — OXYCODONE HYDROCHLORIDE 5 MILLIGRAM(S): 5 TABLET ORAL at 14:15

## 2021-10-06 RX ADMIN — HALOPERIDOL DECANOATE 7.5 MILLIGRAM(S): 100 INJECTION INTRAMUSCULAR at 08:45

## 2021-10-06 RX ADMIN — OXYCODONE HYDROCHLORIDE 5 MILLIGRAM(S): 5 TABLET ORAL at 14:45

## 2021-10-06 RX ADMIN — CHLORHEXIDINE GLUCONATE 1 APPLICATION(S): 213 SOLUTION TOPICAL at 19:34

## 2021-10-06 RX ADMIN — Medication 975 MILLIGRAM(S): at 19:05

## 2021-10-06 RX ADMIN — SODIUM CHLORIDE 42 MILLILITER(S): 9 INJECTION, SOLUTION INTRAVENOUS at 13:38

## 2021-10-06 RX ADMIN — HALOPERIDOL DECANOATE 7.5 MILLIGRAM(S): 100 INJECTION INTRAMUSCULAR at 08:40

## 2021-10-06 RX ADMIN — Medication 975 MILLIGRAM(S): at 18:35

## 2021-10-06 RX ADMIN — SODIUM CHLORIDE 125 MILLILITER(S): 9 INJECTION, SOLUTION INTRAVENOUS at 04:34

## 2021-10-06 RX ADMIN — OXYCODONE HYDROCHLORIDE 5 MILLIGRAM(S): 5 TABLET ORAL at 04:34

## 2021-10-06 RX ADMIN — Medication 650 MILLIGRAM(S): at 01:34

## 2021-10-06 RX ADMIN — SODIUM CHLORIDE 42 MILLILITER(S): 9 INJECTION, SOLUTION INTRAVENOUS at 22:15

## 2021-10-06 RX ADMIN — Medication 2 MILLIGRAM(S): at 08:22
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
## 2021-10-06 NOTE — H&P ADULT - RS GEN PE MLT RESP DETAILS PC
airway patent/respirations non-labored/no subcutaneous emphysema/no wheezes/diminished breath sounds, L

## 2021-10-06 NOTE — H&P ADULT - ATTENDING COMMENTS
Pt seen and examined by me   agree with findings  pt delusional  chest tube placed with re-expansion PTX  admit SICU  serial H/H
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
## 2021-10-06 NOTE — H&P ADULT - NSHPREVIEWOFSYSTEMS_GEN_ALL_CORE
REVIEW OF SYSTEMS      General:	negative    Skin/Breast: negative  	  Ophthalmologic: negative  	  ENMT:	frontal head pain    Respiratory and Thorax: left chest pain  	  Cardiovascular:negative    Gastrointestinal:negative    Genitourinary: negative    Musculoskeletal : negative    Neurological: negative    Psychiatric: +ve for delusional ideation	    Hematology/Lymphatics: negative    Endocrine: negative	    Allergic/Immunologic: negative

## 2021-10-06 NOTE — PROCEDURE NOTE - NSINFORMCONSENT_GEN_A_CORE
Benefits, risks, and possible complications of procedure explained to patient/caregiver who verbalized understanding and gave verbal consent. Patient with active delusions but able to understand indications, procedure, risk/benefits for consent/Benefits, risks, and possible complications of procedure explained to patient/caregiver who verbalized understanding and gave verbal consent.
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
## 2021-10-06 NOTE — H&P ADULT - EXTREMITIES
Called pt and left message regarding missed visit today and when next scheduled appt is.   
detailed exam
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
## 2021-10-07 LAB
ANION GAP SERPL CALC-SCNC: 8 MMOL/L — SIGNIFICANT CHANGE UP (ref 5–17)
BASOPHILS # BLD AUTO: 0.03 K/UL — SIGNIFICANT CHANGE UP (ref 0–0.2)
BASOPHILS NFR BLD AUTO: 0.3 % — SIGNIFICANT CHANGE UP (ref 0–2)
BUN SERPL-MCNC: 14 MG/DL — SIGNIFICANT CHANGE UP (ref 8–20)
CALCIUM SERPL-MCNC: 8.6 MG/DL — SIGNIFICANT CHANGE UP (ref 8.6–10.2)
CHLORIDE SERPL-SCNC: 102 MMOL/L — SIGNIFICANT CHANGE UP (ref 98–107)
CO2 SERPL-SCNC: 26 MMOL/L — SIGNIFICANT CHANGE UP (ref 22–29)
COVID-19 SPIKE DOMAIN AB INTERP: NEGATIVE — SIGNIFICANT CHANGE UP
COVID-19 SPIKE DOMAIN ANTIBODY RESULT: 0.4 U/ML — SIGNIFICANT CHANGE UP
CREAT SERPL-MCNC: 0.81 MG/DL — SIGNIFICANT CHANGE UP (ref 0.5–1.3)
EOSINOPHIL # BLD AUTO: 0.06 K/UL — SIGNIFICANT CHANGE UP (ref 0–0.5)
EOSINOPHIL NFR BLD AUTO: 0.7 % — SIGNIFICANT CHANGE UP (ref 0–6)
GLUCOSE SERPL-MCNC: 89 MG/DL — SIGNIFICANT CHANGE UP (ref 70–99)
HCT VFR BLD CALC: 25.4 % — LOW (ref 39–50)
HGB BLD-MCNC: 8.6 G/DL — LOW (ref 13–17)
IMM GRANULOCYTES NFR BLD AUTO: 0.6 % — SIGNIFICANT CHANGE UP (ref 0–1.5)
LYMPHOCYTES # BLD AUTO: 2.14 K/UL — SIGNIFICANT CHANGE UP (ref 1–3.3)
LYMPHOCYTES # BLD AUTO: 24.7 % — SIGNIFICANT CHANGE UP (ref 13–44)
MAGNESIUM SERPL-MCNC: 1.9 MG/DL — SIGNIFICANT CHANGE UP (ref 1.6–2.6)
MCHC RBC-ENTMCNC: 30.5 PG — SIGNIFICANT CHANGE UP (ref 27–34)
MCHC RBC-ENTMCNC: 33.9 GM/DL — SIGNIFICANT CHANGE UP (ref 32–36)
MCV RBC AUTO: 90.1 FL — SIGNIFICANT CHANGE UP (ref 80–100)
MONOCYTES # BLD AUTO: 0.95 K/UL — HIGH (ref 0–0.9)
MONOCYTES NFR BLD AUTO: 11 % — SIGNIFICANT CHANGE UP (ref 2–14)
NEUTROPHILS # BLD AUTO: 5.44 K/UL — SIGNIFICANT CHANGE UP (ref 1.8–7.4)
NEUTROPHILS NFR BLD AUTO: 62.7 % — SIGNIFICANT CHANGE UP (ref 43–77)
PHOSPHATE SERPL-MCNC: 2.2 MG/DL — LOW (ref 2.4–4.7)
PLATELET # BLD AUTO: 190 K/UL — SIGNIFICANT CHANGE UP (ref 150–400)
POTASSIUM SERPL-MCNC: 3.9 MMOL/L — SIGNIFICANT CHANGE UP (ref 3.5–5.3)
POTASSIUM SERPL-SCNC: 3.9 MMOL/L — SIGNIFICANT CHANGE UP (ref 3.5–5.3)
RBC # BLD: 2.82 M/UL — LOW (ref 4.2–5.8)
RBC # FLD: 13.2 % — SIGNIFICANT CHANGE UP (ref 10.3–14.5)
SARS-COV-2 IGG+IGM SERPL QL IA: 0.4 U/ML — SIGNIFICANT CHANGE UP
SARS-COV-2 IGG+IGM SERPL QL IA: NEGATIVE — SIGNIFICANT CHANGE UP
SODIUM SERPL-SCNC: 136 MMOL/L — SIGNIFICANT CHANGE UP (ref 135–145)
WBC # BLD: 8.67 K/UL — SIGNIFICANT CHANGE UP (ref 3.8–10.5)
WBC # FLD AUTO: 8.67 K/UL — SIGNIFICANT CHANGE UP (ref 3.8–10.5)

## 2021-10-07 PROCEDURE — 71045 X-RAY EXAM CHEST 1 VIEW: CPT | Mod: 26,76

## 2021-10-07 PROCEDURE — 99232 SBSQ HOSP IP/OBS MODERATE 35: CPT

## 2021-10-07 RX ORDER — ENOXAPARIN SODIUM 100 MG/ML
30 INJECTION SUBCUTANEOUS
Refills: 0 | Status: DISCONTINUED | OUTPATIENT
Start: 2021-10-07 | End: 2021-10-15

## 2021-10-07 RX ORDER — POLYETHYLENE GLYCOL 3350 17 G/17G
17 POWDER, FOR SOLUTION ORAL DAILY
Refills: 0 | Status: DISCONTINUED | OUTPATIENT
Start: 2021-10-07 | End: 2021-10-15

## 2021-10-07 RX ORDER — SENNA PLUS 8.6 MG/1
2 TABLET ORAL AT BEDTIME
Refills: 0 | Status: DISCONTINUED | OUTPATIENT
Start: 2021-10-07 | End: 2021-10-15

## 2021-10-07 RX ORDER — ACETAMINOPHEN 500 MG
975 TABLET ORAL EVERY 6 HOURS
Refills: 0 | Status: DISCONTINUED | OUTPATIENT
Start: 2021-10-07 | End: 2021-10-15

## 2021-10-07 RX ORDER — POTASSIUM PHOSPHATE, MONOBASIC POTASSIUM PHOSPHATE, DIBASIC 236; 224 MG/ML; MG/ML
30 INJECTION, SOLUTION INTRAVENOUS ONCE
Refills: 0 | Status: COMPLETED | OUTPATIENT
Start: 2021-10-07 | End: 2021-10-07

## 2021-10-07 RX ORDER — HALOPERIDOL DECANOATE 100 MG/ML
2.5 INJECTION INTRAMUSCULAR ONCE
Refills: 0 | Status: COMPLETED | OUTPATIENT
Start: 2021-10-07 | End: 2021-10-07

## 2021-10-07 RX ORDER — DEXMEDETOMIDINE HYDROCHLORIDE IN 0.9% SODIUM CHLORIDE 4 UG/ML
0.2 INJECTION INTRAVENOUS
Qty: 200 | Refills: 0 | Status: DISCONTINUED | OUTPATIENT
Start: 2021-10-07 | End: 2021-10-08

## 2021-10-07 RX ADMIN — Medication 1 APPLICATION(S): at 05:11

## 2021-10-07 RX ADMIN — POTASSIUM PHOSPHATE, MONOBASIC POTASSIUM PHOSPHATE, DIBASIC 83.33 MILLIMOLE(S): 236; 224 INJECTION, SOLUTION INTRAVENOUS at 09:15

## 2021-10-07 RX ADMIN — DEXMEDETOMIDINE HYDROCHLORIDE IN 0.9% SODIUM CHLORIDE 3.7 MICROGRAM(S)/KG/HR: 4 INJECTION INTRAVENOUS at 19:36

## 2021-10-07 RX ADMIN — OXYCODONE HYDROCHLORIDE 5 MILLIGRAM(S): 5 TABLET ORAL at 05:26

## 2021-10-07 RX ADMIN — Medication 975 MILLIGRAM(S): at 05:11

## 2021-10-07 RX ADMIN — Medication 2 MILLIGRAM(S): at 18:15

## 2021-10-07 RX ADMIN — HALOPERIDOL DECANOATE 2.5 MILLIGRAM(S): 100 INJECTION INTRAMUSCULAR at 19:37

## 2021-10-07 RX ADMIN — CHLORHEXIDINE GLUCONATE 1 APPLICATION(S): 213 SOLUTION TOPICAL at 05:13

## 2021-10-07 RX ADMIN — Medication 975 MILLIGRAM(S): at 06:00

## 2021-10-07 RX ADMIN — Medication 975 MILLIGRAM(S): at 12:43

## 2021-10-07 RX ADMIN — Medication 50 MILLIGRAM(S): at 18:41

## 2021-10-07 RX ADMIN — Medication 975 MILLIGRAM(S): at 00:16

## 2021-10-07 RX ADMIN — Medication 975 MILLIGRAM(S): at 11:52

## 2021-10-07 RX ADMIN — HALOPERIDOL DECANOATE 7.5 MILLIGRAM(S): 100 INJECTION INTRAMUSCULAR at 18:15

## 2021-10-07 RX ADMIN — Medication 975 MILLIGRAM(S): at 00:47

## 2021-10-07 RX ADMIN — LIDOCAINE 2 PATCH: 4 CREAM TOPICAL at 19:41

## 2021-10-07 RX ADMIN — OXYCODONE HYDROCHLORIDE 5 MILLIGRAM(S): 5 TABLET ORAL at 05:11

## 2021-10-07 RX ADMIN — LIDOCAINE 2 PATCH: 4 CREAM TOPICAL at 11:52

## 2021-10-07 NOTE — CHART NOTE - NSCHARTNOTEFT_GEN_A_CORE
Pt became slightly agitated, stating he must go home.  Also stating that he is Bautista palafox, Coleman aWllace, Richard Jono and his hair and eyes will change to prove it to me.  I explained to him that he should not go due to medical reasons, that we will get a CXR in AM to ensure his safety.  He stated that he will not die because he is Richard and it is not part of his father's plan.    I asked him to stay so that psychiatry could speak with him and he refused.  I had spoken to Psych over phone and they stated they will come to see pt tomorrow.   I gave pt Haldol, Ativan for acute psychosis and to return pt to his improved baseline mental and psychiatric status as previously recommended by Psych consult in chart.    I have upgraded his downgrade bed to include Tele.  At this time, pt is more cooperative, has voluntarily laid back in bed.  Still answers to verbal questions and is more verbally appropriate.  Also allows placement of monitors.      AVSS.  No SN of QT Prolongation. He may downgrade to that bed after short observation in SICU after Haldol. Pt became slightly agitated, stating he must go home.  Also stating that he is Bautista palafox, Coleman Wallace, Richard Jono and his hair and eyes will change to prove it to me.  I explained to him that he should not go due to medical reasons, that we will get a CXR in AM to ensure his safety.  He stated that he will not die because he is Richard and it is not part of his father's plan.    I asked him to stay so that psychiatry could speak with him and he refused.  I had spoken to Psych over phone and they stated they will come to see pt tomorrow.   I gave pt Haldol, Ativan for acute psychosis and to return pt to his improved baseline mental and psychiatric status as previously recommended by Psych consult in chart.    I have upgraded his downgrade bed to include Tele.  At this time, pt is more cooperative, has voluntarily laid back in bed.  Still answers to verbal questions and is more verbally appropriate.  Also allows placement of monitors.      AVSS.  No SN of QT Prolongation. He may downgrade to that bed after short observation in SICU after Haldol.     Addendum: I discussed case with Dr Phillips and he feels pt safer to remain in SICU.    Pt will remain in SICU until medically cleared and availability to in pt psych
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
## 2021-10-07 NOTE — CHART NOTE - NSCHARTNOTEFT_GEN_A_CORE
SICU TRANSFER NOTE  -----------------------------  ICU Admission Date: 10/6  Transfer Date: 10-07-21 @ 09:44    Admission Diagnosis:   1. Mildly displaced Left 10-11 rib frx  2. Left Moderate PTX with Chest tube placement.  3. Grade 2 Liver laceration  4. Grade 2 Spleen laceration  5. Schitzo-effective disorder/ Bi-Polar/ Psychosis, homicidal ideation. Delusional disorder    Procedures: Left chest tube 10/6    Consultants:    Psychiatry    Medications  acetaminophen   Tablet .. 975 milliGRAM(s) Oral every 6 hours  BACItracin   Ointment 1 Application(s) Topical two times a day  ceFAZolin   IVPB 2000 milliGRAM(s) IV Intermittent once  chlorhexidine 2% Cloths 1 Application(s) Topical <User Schedule>  diphenhydrAMINE   Injectable 50 milliGRAM(s) IntraMuscular every 6 hours PRN  enoxaparin Injectable 30 milliGRAM(s) SubCutaneous two times a day  haloperidol    Injectable 7.5 milliGRAM(s) IntraMuscular every 6 hours PRN  HYDROmorphone   Tablet 0.5 milliGRAM(s) Oral every 4 hours PRN  influenza   Vaccine 0.5 milliLiter(s) IntraMuscular once  lidocaine   4% Patch 2 Patch Transdermal daily  lidocaine 1% (Preservative-free) Injectable 20 milliLiter(s) Local Injection once  LORazepam   Injectable 2 milliGRAM(s) IntraMuscular every 6 hours PRN  ondansetron Injectable 4 milliGRAM(s) IV Push every 6 hours PRN  oxyCODONE    IR 5 milliGRAM(s) Oral every 4 hours PRN  oxyCODONE    IR 10 milliGRAM(s) Oral every 4 hours PRN  polyethylene glycol 3350 17 Gram(s) Oral daily  senna 2 Tablet(s) Oral at bedtime      [x] I attest I have reviewed and reconciled all medications prior to transfer    IV Fluids  lactated ringers.: Solution, 1000 milliLiter(s) infuse at 42 mL/Hr  Provider's Contact #: [phone]  lactated ringers.: Solution, 1000 milliLiter(s) infuse at 125 mL/Hr      I have discussed this case with Trauma team Nic Rosas upon transfer and all questions regarding ICU course were answered.  The following items are to be followed up:    1. CXR at 2PM. Remove Chest tube if no PTX.  2. AM CXR 10/8.  Sooner if respiratory SX.  3. Daily CBC and serial abdomen exams in watch for peritonitis or hemodynamic instability from Liver/ Spleen laceration   4. In patient Psych when medically cleared.

## 2021-10-08 LAB — SARS-COV-2 RNA SPEC QL NAA+PROBE: SIGNIFICANT CHANGE UP

## 2021-10-08 PROCEDURE — 99232 SBSQ HOSP IP/OBS MODERATE 35: CPT

## 2021-10-08 PROCEDURE — 71045 X-RAY EXAM CHEST 1 VIEW: CPT | Mod: 26

## 2021-10-08 RX ADMIN — Medication 50 MILLIGRAM(S): at 17:15

## 2021-10-08 RX ADMIN — HALOPERIDOL DECANOATE 7.5 MILLIGRAM(S): 100 INJECTION INTRAMUSCULAR at 23:17

## 2021-10-08 RX ADMIN — Medication 2 MILLIGRAM(S): at 07:54

## 2021-10-08 RX ADMIN — ENOXAPARIN SODIUM 30 MILLIGRAM(S): 100 INJECTION SUBCUTANEOUS at 17:12

## 2021-10-08 RX ADMIN — HALOPERIDOL DECANOATE 7.5 MILLIGRAM(S): 100 INJECTION INTRAMUSCULAR at 07:54

## 2021-10-08 RX ADMIN — Medication 2 MILLIGRAM(S): at 23:16

## 2021-10-08 RX ADMIN — LIDOCAINE 2 PATCH: 4 CREAM TOPICAL at 00:09

## 2021-10-08 RX ADMIN — SENNA PLUS 2 TABLET(S): 8.6 TABLET ORAL at 21:30

## 2021-10-08 RX ADMIN — LIDOCAINE 2 PATCH: 4 CREAM TOPICAL at 21:31

## 2021-10-08 RX ADMIN — Medication 2 MILLIGRAM(S): at 17:15

## 2021-10-08 RX ADMIN — Medication 50 MILLIGRAM(S): at 07:54

## 2021-10-08 RX ADMIN — Medication 1 APPLICATION(S): at 17:18

## 2021-10-08 RX ADMIN — Medication 50 MILLIGRAM(S): at 23:16

## 2021-10-08 RX ADMIN — LIDOCAINE 2 PATCH: 4 CREAM TOPICAL at 11:26

## 2021-10-08 RX ADMIN — HALOPERIDOL DECANOATE 7.5 MILLIGRAM(S): 100 INJECTION INTRAMUSCULAR at 17:14
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
## 2021-10-08 NOTE — CHART NOTE - NSCHARTNOTESELECT_GEN_ALL_CORE
Chest tube removal/Event Note
Event Note
Transfer Note
Downgrade SICU/Transfer Note
SICU PA/Event Note
SW Note/Event Note
Tertiary Survey/Event Note
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
## 2021-10-08 NOTE — DIETITIAN INITIAL EVALUATION ADULT. - WEIGHT FOR BMI (KG)
CONCERNS/SYMPTOMS:  Александр has white patches on her tongue and inside the mouth. It is adherent. She seems to be okay and not in pain. NO fever. As she is over 6 months recommended coming into clinic for evaluation. However, it was recently noted that amoxicillin was rx'd.  No standing protocol for nystatin and moonlighters are here today. Appointment was scheduled.  PROBLEM LIST CHECKED:  in chart only  ALLERGIES:  See [facility] charting  PROTOCOL USED:  Symptoms discussed and advice given per GUIDELINE-- Louis , Telephone Care Office Protocols, JAZMIN Maxwell, 15th edition, 2016  MEDICATIONS RECOMMENDED:  None  DISPOSITION:  See today, appt given    Patient/parent agrees with plan and expresses understanding.  Call back if symptoms are not improving or worse.  Staff name/title:  Gisella Stapleton RN       [phone]
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
## 2021-10-08 NOTE — DIETITIAN INITIAL EVALUATION ADULT. - PERTINENT LABORATORY DATA
10-07 Na136 mmol/L Glu 89 mg/dL K+ 3.9 mmol/L Cr  0.81 mg/dL BUN 14.0 mg/dL Phos 2.2 mg/dL<L> Alb n/a   PAB n/a
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
## 2021-10-08 NOTE — PROGRESS NOTE ADULT - SUBJECTIVE AND OBJECTIVE BOX
24 HOUR EVENTS: Patient had episode of agitation "wanting to leave".  Patient given haldol with adequate response.  Patient     SUBJECTIVE/ROS:  [ ] A ten-point review of systems was otherwise negative except as noted.  [ ] Due to altered mental status/intubation, subjective information were not able to be obtained from the patient. History was obtained, to the extent possible, from review of the chart and collateral sources of information.      NEURO  RASS:     GCS:     CAM ICU:  Exam:   Meds: acetaminophen   Tablet .. 975 milliGRAM(s) Oral every 6 hours PRN Mild Pain (1 - 3)  dexMEDEtomidine Infusion 0.2 MICROgram(s)/kG/Hr IV Continuous <Continuous>  haloperidol    Injectable 7.5 milliGRAM(s) IntraMuscular every 6 hours PRN Agitation  HYDROmorphone   Tablet 0.5 milliGRAM(s) Oral every 4 hours PRN breakthrough pain  LORazepam   Injectable 2 milliGRAM(s) IntraMuscular every 6 hours PRN Agitation  ondansetron Injectable 4 milliGRAM(s) IV Push every 6 hours PRN Nausea  oxyCODONE    IR 5 milliGRAM(s) Oral every 4 hours PRN Moderate Pain (4 - 6)  oxyCODONE    IR 10 milliGRAM(s) Oral every 4 hours PRN Severe Pain (7 - 10)    [x] Adequacy of sedation and pain control has been assessed and adjusted      RESPIRATORY  RR: 16 (10-08-21 @ 03:00) (15 - 25)  SpO2: 98% (10-08-21 @ 03:00) (95% - 99%)  Wt(kg): --  Exam: unlabored, clear to auscultation bilaterally  Mechanical Ventilation:     [ ] Extubation Readiness Assessed  Meds: diphenhydrAMINE   Injectable 50 milliGRAM(s) IntraMuscular every 6 hours PRN Agitation        CARDIOVASCULAR  HR: 71 (10-08-21 @ 03:00) (71 - 109)  BP: 97/57 (10-08-21 @ 03:00) (79/59 - 137/86)  BP(mean): 68 (10-08-21 @ 03:00) (57 - 104)  ABP: --  ABP(mean): --  Wt(kg): --  CVP(cm H2O): --      Exam:  Cardiac Rhythm:  Perfusion     [ ]Adequate   [ ]Inadequate  Mentation   [ ]Normal       [ ]Reduced  Extremities  [ ]Warm         [ ]Cool  Volume Status [ ]Hypervolemic [ ]Euvolemic [ ]Hypovolemic  Meds:       GI/NUTRITION  Exam:  Diet:  Meds: polyethylene glycol 3350 17 Gram(s) Oral daily  senna 2 Tablet(s) Oral at bedtime      GENITOURINARY  I&O's Detail    10-06 @ 07:01  -  10-07 @ 07:00  --------------------------------------------------------  IN:    Dexmedetomidine: 19.2 mL    Lactated Ringers: 798 mL    Oral Fluid: 350 mL  Total IN: 1167.2 mL    OUT:    Chest Tube (mL): 99 mL    Voided (mL): 475 mL  Total OUT: 574 mL    Total NET: 593.2 mL      10-07 @ 07:01  -  10-08 @ 04:00  --------------------------------------------------------  IN:    Dexmedetomidine: 16.6 mL    IV PiggyBack: 498 mL  Total IN: 514.6 mL    OUT:    Voided (mL): 500 mL  Total OUT: 500 mL    Total NET: 14.6 mL          10-07    136  |  102  |  14.0  ----------------------------<  89  3.9   |  26.0  |  0.81    Ca    8.6      07 Oct 2021 05:41  Phos  2.2     10-07  Mg     1.9     10-07    TPro  5.9<L>  /  Alb  4.0  /  TBili  0.7  /  DBili  x   /  AST  141<H>  /  ALT  49<H>  /  AlkPhos  66  10-06    [ ] Lisa catheter, indication: N/A  Meds:       HEMATOLOGIC  Meds: enoxaparin Injectable 30 milliGRAM(s) SubCutaneous two times a day    [x] VTE Prophylaxis                        8.6    8.67  )-----------( 190      ( 07 Oct 2021 05:41 )             25.4       Transfusion     [ ] PRBC   [ ] Platelets   [ ] FFP   [ ] Cryoprecipitate      INFECTIOUS DISEASES  T(C): 37.1 (10-07-21 @ 23:29), Max: 37.1 (10-07-21 @ 04:33)  Wt(kg): --  WBC Count: 8.67 K/uL (10-07 @ 05:41)    Recent Cultures:    Meds: ceFAZolin   IVPB 2000 milliGRAM(s) IV Intermittent once  influenza   Vaccine 0.5 milliLiter(s) IntraMuscular once        ENDOCRINE  Capillary Blood Glucose    Meds:       ACCESS DEVICES:  [ ] Peripheral IV  [ ] Central Venous Line	[ ] R	[ ] L	[ ] IJ	[ ] Fem	[ ] SC	Placed:   [ ] Arterial Line		[ ] R	[ ] L	[ ] Fem	[ ] Rad	[ ] Ax	Placed:   [ ] PICC:					[ ] Mediport  [ ] Urinary Catheter, Date Placed:   [ ] Necessity of urinary, arterial, and venous catheters discussed    OTHER MEDICATIONS:  BACItracin   Ointment 1 Application(s) Topical two times a day  chlorhexidine 2% Cloths 1 Application(s) Topical <User Schedule>  lidocaine   4% Patch 2 Patch Transdermal daily      CODE STATUS:     IMAGING:     24 HOUR EVENTS: Patient had episode of agitation "wanting to leave".  Patient given haldol with adequate response.  Patient started on precedex but blood pressure unable to tolerate so held. Patient still states inappropriate comments to staff and states commands staff. No other issues. Voiding without issues and tolerating diet. CXR without ptx after removal of chest tube. Sating well.     SUBJECTIVE/ROS:  [x ] A ten-point review of systems was otherwise negative except as noted.  [ ] Due to altered mental status/intubation, subjective information were not able to be obtained from the patient. History was obtained, to the extent possible, from review of the chart and collateral sources of information.      NEURO  RASS:  0- +2   GCS:  14-15   CAM ICU:  Exam: No focal neuro deficits, LEVINE, 5/5 strength, still with grandiosity   Meds: acetaminophen   Tablet .. 975 milliGRAM(s) Oral every 6 hours PRN Mild Pain (1 - 3)  dexMEDEtomidine Infusion 0.2 MICROgram(s)/kG/Hr IV Continuous <Continuous>  haloperidol    Injectable 7.5 milliGRAM(s) IntraMuscular every 6 hours PRN Agitation  HYDROmorphone   Tablet 0.5 milliGRAM(s) Oral every 4 hours PRN breakthrough pain  LORazepam   Injectable 2 milliGRAM(s) IntraMuscular every 6 hours PRN Agitation  ondansetron Injectable 4 milliGRAM(s) IV Push every 6 hours PRN Nausea  oxyCODONE    IR 5 milliGRAM(s) Oral every 4 hours PRN Moderate Pain (4 - 6)  oxyCODONE    IR 10 milliGRAM(s) Oral every 4 hours PRN Severe Pain (7 - 10)    [x] Adequacy of sedation and pain control has been assessed and adjusted      RESPIRATORY  RR: 16 (10-08-21 @ 03:00) (15 - 25)  SpO2: 98% (10-08-21 @ 03:00) (95% - 99%)  Wt(kg): --  Exam: unlabored, clear to auscultation bilaterally  Mechanical Ventilation:     [ ] Extubation Readiness Assessed  Meds: diphenhydrAMINE   Injectable 50 milliGRAM(s) IntraMuscular every 6 hours PRN Agitation        CARDIOVASCULAR  HR: 71 (10-08-21 @ 03:00) (71 - 109)  BP: 97/57 (10-08-21 @ 03:00) (79/59 - 137/86)  BP(mean): 68 (10-08-21 @ 03:00) (57 - 104)  ABP: --  ABP(mean): --  Wt(kg): --  CVP(cm H2O): --      Exam: nsr  Cardiac Rhythm: nsr  Perfusion     [x ]Adequate   [ ]Inadequate  Mentation   [x ]Normal       [ ]Reduced  Extremities  [x ]Warm         [ ]Cool  Volume Status [ ]Hypervolemic [x ]Euvolemic [ ]Hypovolemic  Meds:       GI/NUTRITION   Exam: soft, nttp,nd   Diet: regular diet   Meds: polyethylene glycol 3350 17 Gram(s) Oral daily  senna 2 Tablet(s) Oral at bedtime      GENITOURINARY  I&O's Detail    10-06 @ 07:01  -  10-07 @ 07:00  --------------------------------------------------------  IN:    Dexmedetomidine: 19.2 mL    Lactated Ringers: 798 mL    Oral Fluid: 350 mL  Total IN: 1167.2 mL    OUT:    Chest Tube (mL): 99 mL    Voided (mL): 475 mL  Total OUT: 574 mL    Total NET: 593.2 mL      10-07 @ 07:01  -  10-08 @ 04:00  --------------------------------------------------------  IN:    Dexmedetomidine: 16.6 mL    IV PiggyBack: 498 mL  Total IN: 514.6 mL    OUT:    Voided (mL): 500 mL  Total OUT: 500 mL    Total NET: 14.6 mL          10-07    136  |  102  |  14.0  ----------------------------<  89  3.9   |  26.0  |  0.81    Ca    8.6      07 Oct 2021 05:41  Phos  2.2     10-07  Mg     1.9     10-07    TPro  5.9<L>  /  Alb  4.0  /  TBili  0.7  /  DBili  x   /  AST  141<H>  /  ALT  49<H>  /  AlkPhos  66  10-06    [ ] Lisa catheter, indication: N/A  Meds:       HEMATOLOGIC  Meds: enoxaparin Injectable 30 milliGRAM(s) SubCutaneous two times a day    [x] VTE Prophylaxis                        8.6    8.67  )-----------( 190      ( 07 Oct 2021 05:41 )             25.4       Transfusion     [ ] PRBC   [ ] Platelets   [ ] FFP   [ ] Cryoprecipitate      INFECTIOUS DISEASES  T(C): 37.1 (10-07-21 @ 23:29), Max: 37.1 (10-07-21 @ 04:33)  Wt(kg): --  WBC Count: 8.67 K/uL (10-07 @ 05:41)    Recent Cultures:    Meds: ceFAZolin   IVPB 2000 milliGRAM(s) IV Intermittent once  influenza   Vaccine 0.5 milliLiter(s) IntraMuscular once        ENDOCRINE  Capillary Blood Glucose    Meds:       ACCESS DEVICES:  [ ] Peripheral IV  [ ] Central Venous Line	[ ] R	[ ] L	[ ] IJ	[ ] Fem	[ ] SC	Placed:   [ ] Arterial Line		[ ] R	[ ] L	[ ] Fem	[ ] Rad	[ ] Ax	Placed:   [ ] PICC:					[ ] Mediport  [ ] Urinary Catheter, Date Placed:   [ ] Necessity of urinary, arterial, and venous catheters discussed    OTHER MEDICATIONS:  BACItracin   Ointment 1 Application(s) Topical two times a day  chlorhexidine 2% Cloths 1 Application(s) Topical <User Schedule>  lidocaine   4% Patch 2 Patch Transdermal daily      CODE STATUS:     IMAGING:

## 2021-10-09 PROCEDURE — 99231 SBSQ HOSP IP/OBS SF/LOW 25: CPT | Mod: GC

## 2021-10-09 RX ADMIN — OXYCODONE HYDROCHLORIDE 10 MILLIGRAM(S): 5 TABLET ORAL at 22:01

## 2021-10-09 RX ADMIN — HALOPERIDOL DECANOATE 7.5 MILLIGRAM(S): 100 INJECTION INTRAMUSCULAR at 05:25

## 2021-10-09 RX ADMIN — HALOPERIDOL DECANOATE 7.5 MILLIGRAM(S): 100 INJECTION INTRAMUSCULAR at 21:31

## 2021-10-09 RX ADMIN — Medication 50 MILLIGRAM(S): at 21:31

## 2021-10-09 RX ADMIN — SENNA PLUS 2 TABLET(S): 8.6 TABLET ORAL at 21:30

## 2021-10-09 RX ADMIN — CHLORHEXIDINE GLUCONATE 1 APPLICATION(S): 213 SOLUTION TOPICAL at 05:26

## 2021-10-09 RX ADMIN — Medication 50 MILLIGRAM(S): at 05:25

## 2021-10-09 RX ADMIN — Medication 1 APPLICATION(S): at 05:26

## 2021-10-09 RX ADMIN — Medication 2 MILLIGRAM(S): at 11:38

## 2021-10-09 RX ADMIN — LIDOCAINE 2 PATCH: 4 CREAM TOPICAL at 22:00

## 2021-10-09 RX ADMIN — HALOPERIDOL DECANOATE 7.5 MILLIGRAM(S): 100 INJECTION INTRAMUSCULAR at 11:38

## 2021-10-09 RX ADMIN — OXYCODONE HYDROCHLORIDE 10 MILLIGRAM(S): 5 TABLET ORAL at 21:31

## 2021-10-09 RX ADMIN — Medication 50 MILLIGRAM(S): at 11:38

## 2021-10-09 RX ADMIN — LIDOCAINE 2 PATCH: 4 CREAM TOPICAL at 11:39

## 2021-10-09 RX ADMIN — ENOXAPARIN SODIUM 30 MILLIGRAM(S): 100 INJECTION SUBCUTANEOUS at 05:40

## 2021-10-09 RX ADMIN — Medication 2 MILLIGRAM(S): at 05:25
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
## 2021-10-10 RX ADMIN — HALOPERIDOL DECANOATE 7.5 MILLIGRAM(S): 100 INJECTION INTRAMUSCULAR at 14:04

## 2021-10-10 RX ADMIN — CHLORHEXIDINE GLUCONATE 1 APPLICATION(S): 213 SOLUTION TOPICAL at 05:44

## 2021-10-10 RX ADMIN — ENOXAPARIN SODIUM 30 MILLIGRAM(S): 100 INJECTION SUBCUTANEOUS at 05:44

## 2021-10-10 RX ADMIN — Medication 50 MILLIGRAM(S): at 14:03

## 2021-10-10 RX ADMIN — Medication 2 MILLIGRAM(S): at 20:11

## 2021-10-10 RX ADMIN — Medication 1 APPLICATION(S): at 05:43

## 2021-10-10 RX ADMIN — Medication 50 MILLIGRAM(S): at 20:11

## 2021-10-10 RX ADMIN — Medication 50 MILLIGRAM(S): at 07:50

## 2021-10-10 RX ADMIN — Medication 2 MILLIGRAM(S): at 07:50

## 2021-10-10 RX ADMIN — Medication 2 MILLIGRAM(S): at 14:03

## 2021-10-10 RX ADMIN — HALOPERIDOL DECANOATE 7.5 MILLIGRAM(S): 100 INJECTION INTRAMUSCULAR at 20:11

## 2021-10-10 RX ADMIN — HALOPERIDOL DECANOATE 7.5 MILLIGRAM(S): 100 INJECTION INTRAMUSCULAR at 07:50

## 2021-10-10 RX ADMIN — LIDOCAINE 2 PATCH: 4 CREAM TOPICAL at 14:03
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
## 2021-10-11 ENCOUNTER — TRANSCRIPTION ENCOUNTER (OUTPATIENT)
Age: 36
End: 2021-10-11

## 2021-10-11 LAB — SARS-COV-2 RNA SPEC QL NAA+PROBE: SIGNIFICANT CHANGE UP

## 2021-10-11 PROCEDURE — 99231 SBSQ HOSP IP/OBS SF/LOW 25: CPT | Mod: GC

## 2021-10-11 RX ORDER — ACETAMINOPHEN 500 MG
3 TABLET ORAL
Qty: 0 | Refills: 0 | DISCHARGE
Start: 2021-10-11

## 2021-10-11 RX ORDER — LIDOCAINE 4 G/100G
1 CREAM TOPICAL
Qty: 0 | Refills: 0 | DISCHARGE
Start: 2021-10-11

## 2021-10-11 RX ORDER — CALCIUM CARBONATE 500(1250)
2 TABLET ORAL EVERY 4 HOURS
Refills: 0 | Status: DISCONTINUED | OUTPATIENT
Start: 2021-10-11 | End: 2021-10-15

## 2021-10-11 RX ORDER — SENNA PLUS 8.6 MG/1
2 TABLET ORAL
Qty: 0 | Refills: 0 | DISCHARGE
Start: 2021-10-11

## 2021-10-11 RX ORDER — POLYETHYLENE GLYCOL 3350 17 G/17G
17 POWDER, FOR SOLUTION ORAL
Qty: 0 | Refills: 0 | DISCHARGE
Start: 2021-10-11

## 2021-10-11 RX ORDER — BACITRACIN ZINC 500 UNIT/G
1 OINTMENT IN PACKET (EA) TOPICAL
Qty: 0 | Refills: 0 | DISCHARGE
Start: 2021-10-11

## 2021-10-11 RX ADMIN — ENOXAPARIN SODIUM 30 MILLIGRAM(S): 100 INJECTION SUBCUTANEOUS at 18:11

## 2021-10-11 RX ADMIN — LIDOCAINE 2 PATCH: 4 CREAM TOPICAL at 12:15

## 2021-10-11 RX ADMIN — HALOPERIDOL DECANOATE 7.5 MILLIGRAM(S): 100 INJECTION INTRAMUSCULAR at 22:09

## 2021-10-11 RX ADMIN — Medication 2 MILLIGRAM(S): at 14:04

## 2021-10-11 RX ADMIN — POLYETHYLENE GLYCOL 3350 17 GRAM(S): 17 POWDER, FOR SOLUTION ORAL at 12:16

## 2021-10-11 RX ADMIN — SENNA PLUS 2 TABLET(S): 8.6 TABLET ORAL at 20:51

## 2021-10-11 RX ADMIN — Medication 50 MILLIGRAM(S): at 22:09

## 2021-10-11 RX ADMIN — OXYCODONE HYDROCHLORIDE 10 MILLIGRAM(S): 5 TABLET ORAL at 09:27

## 2021-10-11 RX ADMIN — LIDOCAINE 2 PATCH: 4 CREAM TOPICAL at 20:00

## 2021-10-11 RX ADMIN — Medication 2 TABLET(S): at 22:25

## 2021-10-11 RX ADMIN — OXYCODONE HYDROCHLORIDE 10 MILLIGRAM(S): 5 TABLET ORAL at 08:53

## 2021-10-11 RX ADMIN — HALOPERIDOL DECANOATE 7.5 MILLIGRAM(S): 100 INJECTION INTRAMUSCULAR at 14:04

## 2021-10-11 RX ADMIN — Medication 2 MILLIGRAM(S): at 22:09

## 2021-10-11 RX ADMIN — Medication 2 MILLIGRAM(S): at 04:42

## 2021-10-11 RX ADMIN — Medication 1 APPLICATION(S): at 18:11

## 2021-10-11 RX ADMIN — Medication 50 MILLIGRAM(S): at 14:04

## 2021-10-11 RX ADMIN — Medication 50 MILLIGRAM(S): at 04:42

## 2021-10-11 RX ADMIN — HALOPERIDOL DECANOATE 7.5 MILLIGRAM(S): 100 INJECTION INTRAMUSCULAR at 04:43

## 2021-10-11 RX ADMIN — LIDOCAINE 2 PATCH: 4 CREAM TOPICAL at 21:52

## 2021-10-11 NOTE — DISCHARGE NOTE PROVIDER - NSDCACTIVITY_GEN_ALL_CORE
Walking - Indoors allowed/No heavy lifting/straining/Walking - Outdoors allowed/Follow Instructions Provided by your Surgical Team

## 2021-10-11 NOTE — DISCHARGE NOTE PROVIDER - NSFOLLOWUPCLINICS_GEN_ALL_ED_FT
[facility] Acute Care Surgery  Acute Care Surgery  [address] [place] [id number]  Phone: [phone]  Fax:   Follow Up Time: 2 weeks
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
## 2021-10-11 NOTE — DISCHARGE NOTE PROVIDER - NSDCMRMEDTOKEN_GEN_ALL_CORE_FT
acetaminophen 325 mg oral tablet: 3 tab(s) orally every 6 hours, As needed, Mild Pain (1 - 3)  bacitracin 500 units/g topical ointment: 1 application topically 2 times a day  lidocaine 4% topical film: Apply topically to affected area once a day  polyethylene glycol 3350 oral powder for reconstitution: 17 gram(s) orally once a day  risperiDONE 2 mg oral tablet: 1 tab(s) orally 2 times a day  senna oral tablet: 2 tab(s) orally once a day (at bedtime)  traZODone 50 mg oral tablet: 1 tab(s) orally once a day (at bedtime), As needed, insomnia

## 2021-10-12 PROCEDURE — 99233 SBSQ HOSP IP/OBS HIGH 50: CPT

## 2021-10-12 PROCEDURE — 99231 SBSQ HOSP IP/OBS SF/LOW 25: CPT | Mod: GC

## 2021-10-12 RX ORDER — DIPHENHYDRAMINE HCL 50 MG
50 CAPSULE ORAL ONCE
Refills: 0 | Status: COMPLETED | OUTPATIENT
Start: 2021-10-12 | End: 2021-10-12

## 2021-10-12 RX ORDER — HALOPERIDOL DECANOATE 100 MG/ML
5 INJECTION INTRAMUSCULAR ONCE
Refills: 0 | Status: COMPLETED | OUTPATIENT
Start: 2021-10-12 | End: 2021-10-12

## 2021-10-12 RX ADMIN — Medication 2 MILLIGRAM(S): at 13:34

## 2021-10-12 RX ADMIN — HALOPERIDOL DECANOATE 7.5 MILLIGRAM(S): 100 INJECTION INTRAMUSCULAR at 08:27

## 2021-10-12 RX ADMIN — Medication 2 MILLIGRAM(S): at 08:27

## 2021-10-12 RX ADMIN — Medication 50 MILLIGRAM(S): at 19:53

## 2021-10-12 RX ADMIN — HALOPERIDOL DECANOATE 7.5 MILLIGRAM(S): 100 INJECTION INTRAMUSCULAR at 19:52

## 2021-10-12 RX ADMIN — ENOXAPARIN SODIUM 30 MILLIGRAM(S): 100 INJECTION SUBCUTANEOUS at 06:14

## 2021-10-12 RX ADMIN — Medication 1 APPLICATION(S): at 06:14

## 2021-10-12 RX ADMIN — Medication 50 MILLIGRAM(S): at 08:27

## 2021-10-12 RX ADMIN — Medication 50 MILLIGRAM(S): at 13:34

## 2021-10-12 RX ADMIN — HALOPERIDOL DECANOATE 5 MILLIGRAM(S): 100 INJECTION INTRAMUSCULAR at 13:34

## 2021-10-12 RX ADMIN — Medication 2 MILLIGRAM(S): at 19:53

## 2021-10-12 NOTE — BH CONSULTATION LIAISON PROGRESS NOTE - NSBHCHARTREVIEWINVESTIGATE_PSY_A_CORE FT
< from: 12 Lead ECG (10.05.21 @ 20:19) >      Ventricular Rate 105 BPM    Atrial Rate 105 BPM    P-R Interval 138 ms    QRS Duration 76 ms    Q-T Interval 310 ms    QTC Calculation(Bazett) 409 ms    P Axis 79 degrees    R Axis 72 degrees    T Axis 69 degrees    Diagnosis Line Sinus tachycardia  Otherwise normal ECG    Confirmed by MARIEL CANTRELL ([id number]) on 10/6/2021 11:14:37 AM    < end of copied text >

## 2021-10-12 NOTE — BH CONSULTATION LIAISON PROGRESS NOTE - NSBHCONSULTPRIMARYDISCUSSYES_PSY_A_CORE FT
Spoke to Medical provider about the plan for admission (Keke).
Spoke to surgical team, KOREY and RN
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
## 2021-10-12 NOTE — BH CONSULTATION LIAISON PROGRESS NOTE - NSBHCHARTREVIEWLAB_PSY_A_CORE FT
Basic Metabolic Panel (10.07.21 @ 05:41)    Sodium, Serum: 136 mmol/L    Potassium, Serum: 3.9 mmol/L    Chloride, Serum: 102 mmol/L    Carbon Dioxide, Serum: 26.0 mmol/L    Anion Gap, Serum: 8 mmol/L    Blood Urea Nitrogen, Serum: 14.0 mg/dL    Creatinine, Serum: 0.81 mg/dL    Glucose, Serum: 89 mg/dL    Calcium, Total Serum: 8.6 mg/dL    eGFR if Non : 116: Interpretative comment  The units for eGFR are mL/min/1.73M2 (normalized body surface area). The  eGFR is calculated from a serum creatinine using the CKD-EPI equation.  Other variables required for calculation are race, age and sex. Among  patients with chronic kidney disease (CKD), the eGFR is useful in  determining the stage of disease according to KDOQI CKD classification.  All eGFR results are reported numerically with the following  interpretation.          GFR                    With                 Without     (ml/min/1.73 m2)    Kidney Damage       Kidney Damage        >= 90                    Stage 1                     Normal        60-89                    Stage 2                     Decreased GFR        30-59     Stage 3                     Stage 3        15-29                    Stage 4                     Stage 4        < 15                      Stage 5                     Stage 5  Each stage of CKD assumes that the associated GFR level has been in  effect for at least 3 months. Determination of stages one and two (with  eGFR > 59 ml/min/m2) requires estimation of kidney damage for at least 3  months as defined by structural or functional abnormalities.  Limitations: All estimates of GFR will be less accurate for patients at  extremes of muscle mass (including but not limited to frail elderly,  critically ill, or cancer patients), those with unusual diets, and those  with conditions associated with reduced secretion or extrarenal  elimination of creatinine. The eGFR equation is not recommended for use  in patients with unstable creatinine levels. mL/min/1.73M2    eGFR if African American: 134 mL/min/1.73M2

## 2021-10-13 RX ORDER — RISPERIDONE 4 MG/1
1 TABLET ORAL AT BEDTIME
Refills: 0 | Status: DISCONTINUED | OUTPATIENT
Start: 2021-10-13 | End: 2021-10-15

## 2021-10-13 RX ADMIN — LIDOCAINE 2 PATCH: 4 CREAM TOPICAL at 20:23

## 2021-10-13 RX ADMIN — Medication 2 MILLIGRAM(S): at 14:17

## 2021-10-13 RX ADMIN — HALOPERIDOL DECANOATE 7.5 MILLIGRAM(S): 100 INJECTION INTRAMUSCULAR at 21:31

## 2021-10-13 RX ADMIN — HALOPERIDOL DECANOATE 7.5 MILLIGRAM(S): 100 INJECTION INTRAMUSCULAR at 08:32

## 2021-10-13 RX ADMIN — Medication 50 MILLIGRAM(S): at 14:17

## 2021-10-13 RX ADMIN — Medication 50 MILLIGRAM(S): at 21:30

## 2021-10-13 RX ADMIN — LIDOCAINE 2 PATCH: 4 CREAM TOPICAL at 14:20

## 2021-10-13 RX ADMIN — Medication 2 MILLIGRAM(S): at 02:28

## 2021-10-13 RX ADMIN — Medication 2 MILLIGRAM(S): at 21:31

## 2021-10-13 RX ADMIN — Medication 50 MILLIGRAM(S): at 08:33

## 2021-10-13 RX ADMIN — Medication 1 APPLICATION(S): at 05:36

## 2021-10-13 RX ADMIN — RISPERIDONE 1 MILLIGRAM(S): 4 TABLET ORAL at 21:32

## 2021-10-13 RX ADMIN — Medication 50 MILLIGRAM(S): at 02:29

## 2021-10-13 RX ADMIN — ENOXAPARIN SODIUM 30 MILLIGRAM(S): 100 INJECTION SUBCUTANEOUS at 05:37

## 2021-10-13 RX ADMIN — HALOPERIDOL DECANOATE 7.5 MILLIGRAM(S): 100 INJECTION INTRAMUSCULAR at 14:18

## 2021-10-13 RX ADMIN — HALOPERIDOL DECANOATE 7.5 MILLIGRAM(S): 100 INJECTION INTRAMUSCULAR at 02:28

## 2021-10-13 RX ADMIN — SENNA PLUS 2 TABLET(S): 8.6 TABLET ORAL at 21:32

## 2021-10-13 RX ADMIN — Medication 2 MILLIGRAM(S): at 08:32

## 2021-10-13 NOTE — PROGRESS NOTE ADULT - SUBJECTIVE AND OBJECTIVE BOX
Acute Care Surgery/Trauma Surgery Progress Note:    No acute overnight events. Patient afebrile, VSS. Pain well controlled and tolerating diet per aid at bedside.  Patient resting in bed and arousable but not very conversational.    Diet, Regular (10-06-21 @ 05:37)      Scheduled Medications:   BACItracin   Ointment 1 Application(s) Topical two times a day  enoxaparin Injectable 30 milliGRAM(s) SubCutaneous two times a day  influenza   Vaccine 0.5 milliLiter(s) IntraMuscular once  lidocaine   4% Patch 2 Patch Transdermal daily  polyethylene glycol 3350 17 Gram(s) Oral daily  senna 2 Tablet(s) Oral at bedtime    PRN Medications:  acetaminophen   Tablet .. 975 milliGRAM(s) Oral every 6 hours PRN Mild Pain (1 - 3)  calcium carbonate    500 mG (Tums) Chewable 2 Tablet(s) Chew every 4 hours PRN Heartburn  diphenhydrAMINE   Injectable 50 milliGRAM(s) IntraMuscular every 6 hours PRN Agitation  haloperidol    Injectable 7.5 milliGRAM(s) IntraMuscular every 6 hours PRN Agitation  oxyCODONE    IR 5 milliGRAM(s) Oral every 4 hours PRN Moderate Pain (4 - 6)  oxyCODONE    IR 10 milliGRAM(s) Oral every 4 hours PRN Severe Pain (7 - 10)      Objective:   T(F): 98.5 (10-12 @ 17:43), Max: 98.5 (10-12 @ 17:43)  HR: 87 (10-12 @ 17:43) (79 - 87)  BP: 99/64 (10-12 @ 17:43) (98/62 - 99/64)  BP(mean): --  ABP: --  ABP(mean): --  RR: 17 (10-12 @ 17:43) (17 - 19)  SpO2: 91% (10-12 @ 17:43) (91% - 98%)      Physical Exam:   GEN: patient resting comfortably in bed, in no acute distress  RESP: respirations are unlabored, no accessory muscle use, no conversational dyspnea  CVS: RRR  GI: Abdomen soft, non-tender, non-distended, no rebound tenderness / guarding    I&O's      LABS:                MICROBIOLOGY:       PATHOLOGY:

## 2021-10-14 PROCEDURE — 99232 SBSQ HOSP IP/OBS MODERATE 35: CPT

## 2021-10-14 RX ADMIN — LIDOCAINE 2 PATCH: 4 CREAM TOPICAL at 01:47

## 2021-10-14 RX ADMIN — ENOXAPARIN SODIUM 30 MILLIGRAM(S): 100 INJECTION SUBCUTANEOUS at 18:50

## 2021-10-14 RX ADMIN — Medication 50 MILLIGRAM(S): at 22:34

## 2021-10-14 RX ADMIN — Medication 1 APPLICATION(S): at 18:50

## 2021-10-14 RX ADMIN — LIDOCAINE 2 PATCH: 4 CREAM TOPICAL at 19:00

## 2021-10-14 RX ADMIN — Medication 50 MILLIGRAM(S): at 09:45

## 2021-10-14 RX ADMIN — HALOPERIDOL DECANOATE 7.5 MILLIGRAM(S): 100 INJECTION INTRAMUSCULAR at 03:34

## 2021-10-14 RX ADMIN — Medication 2 MILLIGRAM(S): at 22:34

## 2021-10-14 RX ADMIN — Medication 50 MILLIGRAM(S): at 03:34

## 2021-10-14 RX ADMIN — RISPERIDONE 1 MILLIGRAM(S): 4 TABLET ORAL at 22:34

## 2021-10-14 RX ADMIN — Medication 2 MILLIGRAM(S): at 03:34

## 2021-10-14 RX ADMIN — Medication 50 MILLIGRAM(S): at 16:00

## 2021-10-14 RX ADMIN — HALOPERIDOL DECANOATE 7.5 MILLIGRAM(S): 100 INJECTION INTRAMUSCULAR at 09:45

## 2021-10-14 RX ADMIN — SENNA PLUS 2 TABLET(S): 8.6 TABLET ORAL at 22:35

## 2021-10-14 RX ADMIN — HALOPERIDOL DECANOATE 7.5 MILLIGRAM(S): 100 INJECTION INTRAMUSCULAR at 22:34

## 2021-10-14 RX ADMIN — Medication 2 MILLIGRAM(S): at 16:00

## 2021-10-14 RX ADMIN — HALOPERIDOL DECANOATE 7.5 MILLIGRAM(S): 100 INJECTION INTRAMUSCULAR at 16:01

## 2021-10-14 RX ADMIN — Medication 2 MILLIGRAM(S): at 09:45

## 2021-10-14 RX ADMIN — LIDOCAINE 2 PATCH: 4 CREAM TOPICAL at 16:01

## 2021-10-14 NOTE — PROGRESS NOTE ADULT - SUBJECTIVE AND OBJECTIVE BOX
SUBJECTIVE/24 hour events:  No acute overnight events. Patient afebrile, VSS. Pain well controlled and tolerating diet per aid at bedside.      Vital Signs Last 24 Hrs  T(C): 36.9 (13 Oct 2021 22:28), Max: 36.9 (13 Oct 2021 22:28)  T(F): 98.5 (13 Oct 2021 22:28), Max: 98.5 (13 Oct 2021 22:28)  HR: 78 (13 Oct 2021 22:28) (77 - 78)  BP: 101/65 (13 Oct 2021 22:28) (100/64 - 101/65)  BP(mean): --  RR: 18 (13 Oct 2021 22:28) (17 - 18)  SpO2: 98% (13 Oct 2021 22:28) (95% - 98%)  Drug Dosing Weight  Height (cm): 172.7 (06 Oct 2021 05:50)  Weight (kg): 73.9 (06 Oct 2021 05:50)  BMI (kg/m2): 24.8 (06 Oct 2021 05:50)  BSA (m2): 1.87 (06 Oct 2021 05:50)  I&O's Detail    Allergies    Advil (Unknown)  penicillin (Unknown)    Intolerances    Plastic Utensils Only (Unknown)              ROS:    PHYSICAL EXAM:  GEN: patient resting comfortably in bed, in no acute distress  RESP: respirations are unlabored, no accessory muscle use, no conversational dyspnea  CVS: RRR  GI: Abdomen soft, non-tender, non-distended, no rebound tenderness / guarding        MEDICATIONS  (STANDING):  BACItracin   Ointment 1 Application(s) Topical two times a day  enoxaparin Injectable 30 milliGRAM(s) SubCutaneous two times a day  influenza   Vaccine 0.5 milliLiter(s) IntraMuscular once  lidocaine   4% Patch 2 Patch Transdermal daily  polyethylene glycol 3350 17 Gram(s) Oral daily  risperiDONE   Tablet 1 milliGRAM(s) Oral at bedtime  senna 2 Tablet(s) Oral at bedtime    MEDICATIONS  (PRN):  acetaminophen   Tablet .. 975 milliGRAM(s) Oral every 6 hours PRN Mild Pain (1 - 3)  calcium carbonate    500 mG (Tums) Chewable 2 Tablet(s) Chew every 4 hours PRN Heartburn  diphenhydrAMINE   Injectable 50 milliGRAM(s) IntraMuscular every 6 hours PRN Agitation  haloperidol    Injectable 7.5 milliGRAM(s) IntraMuscular every 6 hours PRN Agitation  LORazepam   Injectable 2 milliGRAM(s) IntraMuscular every 6 hours PRN Agitation      RADIOLOGY STUDIES:    CULTURES:
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
## 2021-10-15 ENCOUNTER — TRANSCRIPTION ENCOUNTER (OUTPATIENT)
Age: 36
End: 2021-10-15

## 2021-10-15 VITALS
HEART RATE: 97 BPM | RESPIRATION RATE: 18 BRPM | OXYGEN SATURATION: 97 % | DIASTOLIC BLOOD PRESSURE: 61 MMHG | TEMPERATURE: 99 F | SYSTOLIC BLOOD PRESSURE: 106 MMHG

## 2021-10-15 LAB
ALBUMIN SERPL ELPH-MCNC: 4.2 G/DL — SIGNIFICANT CHANGE UP (ref 3.3–5.2)
ALP SERPL-CCNC: 105 U/L — SIGNIFICANT CHANGE UP (ref 40–120)
ALT FLD-CCNC: 128 U/L — HIGH
ANION GAP SERPL CALC-SCNC: 13 MMOL/L — SIGNIFICANT CHANGE UP (ref 5–17)
AST SERPL-CCNC: 91 U/L — HIGH
BILIRUB SERPL-MCNC: 0.5 MG/DL — SIGNIFICANT CHANGE UP (ref 0.4–2)
BUN SERPL-MCNC: 11.7 MG/DL — SIGNIFICANT CHANGE UP (ref 8–20)
CALCIUM SERPL-MCNC: 9.5 MG/DL — SIGNIFICANT CHANGE UP (ref 8.6–10.2)
CHLORIDE SERPL-SCNC: 102 MMOL/L — SIGNIFICANT CHANGE UP (ref 98–107)
CO2 SERPL-SCNC: 23 MMOL/L — SIGNIFICANT CHANGE UP (ref 22–29)
CREAT SERPL-MCNC: 0.83 MG/DL — SIGNIFICANT CHANGE UP (ref 0.5–1.3)
GLUCOSE SERPL-MCNC: 128 MG/DL — HIGH (ref 70–99)
HCT VFR BLD CALC: 36.5 % — LOW (ref 39–50)
HGB BLD-MCNC: 11.9 G/DL — LOW (ref 13–17)
MCHC RBC-ENTMCNC: 29.7 PG — SIGNIFICANT CHANGE UP (ref 27–34)
MCHC RBC-ENTMCNC: 32.6 GM/DL — SIGNIFICANT CHANGE UP (ref 32–36)
MCV RBC AUTO: 91 FL — SIGNIFICANT CHANGE UP (ref 80–100)
PLATELET # BLD AUTO: 486 K/UL — HIGH (ref 150–400)
POTASSIUM SERPL-MCNC: 4.3 MMOL/L — SIGNIFICANT CHANGE UP (ref 3.5–5.3)
POTASSIUM SERPL-SCNC: 4.3 MMOL/L — SIGNIFICANT CHANGE UP (ref 3.5–5.3)
PROT SERPL-MCNC: 6.5 G/DL — LOW (ref 6.6–8.7)
RBC # BLD: 4.01 M/UL — LOW (ref 4.2–5.8)
RBC # FLD: 14.1 % — SIGNIFICANT CHANGE UP (ref 10.3–14.5)
SARS-COV-2 RNA SPEC QL NAA+PROBE: SIGNIFICANT CHANGE UP
SODIUM SERPL-SCNC: 138 MMOL/L — SIGNIFICANT CHANGE UP (ref 135–145)
WBC # BLD: 10.7 K/UL — HIGH (ref 3.8–10.5)
WBC # FLD AUTO: 10.7 K/UL — HIGH (ref 3.8–10.5)

## 2021-10-15 PROCEDURE — 80048 BASIC METABOLIC PNL TOTAL CA: CPT

## 2021-10-15 PROCEDURE — U0005: CPT

## 2021-10-15 PROCEDURE — 83735 ASSAY OF MAGNESIUM: CPT

## 2021-10-15 PROCEDURE — 74177 CT ABD & PELVIS W/CONTRAST: CPT | Mod: MB

## 2021-10-15 PROCEDURE — 71260 CT THORAX DX C+: CPT | Mod: MB

## 2021-10-15 PROCEDURE — 99285 EMERGENCY DEPT VISIT HI MDM: CPT

## 2021-10-15 PROCEDURE — 71045 X-RAY EXAM CHEST 1 VIEW: CPT

## 2021-10-15 PROCEDURE — 81001 URINALYSIS AUTO W/SCOPE: CPT

## 2021-10-15 PROCEDURE — 73030 X-RAY EXAM OF SHOULDER: CPT

## 2021-10-15 PROCEDURE — 85027 COMPLETE CBC AUTOMATED: CPT

## 2021-10-15 PROCEDURE — 84100 ASSAY OF PHOSPHORUS: CPT

## 2021-10-15 PROCEDURE — U0003: CPT

## 2021-10-15 PROCEDURE — 86769 SARS-COV-2 COVID-19 ANTIBODY: CPT

## 2021-10-15 PROCEDURE — 99232 SBSQ HOSP IP/OBS MODERATE 35: CPT

## 2021-10-15 PROCEDURE — 36415 COLL VENOUS BLD VENIPUNCTURE: CPT

## 2021-10-15 PROCEDURE — 80307 DRUG TEST PRSMV CHEM ANLYZR: CPT

## 2021-10-15 PROCEDURE — 80053 COMPREHEN METABOLIC PANEL: CPT

## 2021-10-15 PROCEDURE — 85025 COMPLETE CBC W/AUTO DIFF WBC: CPT

## 2021-10-15 PROCEDURE — 0225U NFCT DS DNA&RNA 21 SARSCOV2: CPT

## 2021-10-15 PROCEDURE — 96374 THER/PROPH/DIAG INJ IV PUSH: CPT

## 2021-10-15 PROCEDURE — 93005 ELECTROCARDIOGRAM TRACING: CPT

## 2021-10-15 PROCEDURE — 96375 TX/PRO/DX INJ NEW DRUG ADDON: CPT

## 2021-10-15 RX ADMIN — Medication 50 MILLIGRAM(S): at 12:27

## 2021-10-15 RX ADMIN — Medication 50 MILLIGRAM(S): at 16:58

## 2021-10-15 RX ADMIN — Medication 50 MILLIGRAM(S): at 05:43

## 2021-10-15 RX ADMIN — Medication 1 APPLICATION(S): at 05:43

## 2021-10-15 RX ADMIN — HALOPERIDOL DECANOATE 7.5 MILLIGRAM(S): 100 INJECTION INTRAMUSCULAR at 12:27

## 2021-10-15 RX ADMIN — LIDOCAINE 2 PATCH: 4 CREAM TOPICAL at 04:44

## 2021-10-15 RX ADMIN — HALOPERIDOL DECANOATE 7.5 MILLIGRAM(S): 100 INJECTION INTRAMUSCULAR at 05:43

## 2021-10-15 RX ADMIN — Medication 2 MILLIGRAM(S): at 05:43

## 2021-10-15 RX ADMIN — HALOPERIDOL DECANOATE 7.5 MILLIGRAM(S): 100 INJECTION INTRAMUSCULAR at 17:00

## 2021-10-15 RX ADMIN — LIDOCAINE 2 PATCH: 4 CREAM TOPICAL at 12:30

## 2021-10-15 RX ADMIN — ENOXAPARIN SODIUM 30 MILLIGRAM(S): 100 INJECTION SUBCUTANEOUS at 05:43

## 2021-10-15 RX ADMIN — Medication 2 MILLIGRAM(S): at 12:26

## 2021-10-15 RX ADMIN — Medication 2 MILLIGRAM(S): at 16:56

## 2021-10-15 NOTE — BH CONSULTATION LIAISON PROGRESS NOTE - CURRENT MEDICATION
MEDICATIONS  (STANDING):  BACItracin   Ointment 1 Application(s) Topical two times a day  enoxaparin Injectable 30 milliGRAM(s) SubCutaneous two times a day  influenza   Vaccine 0.5 milliLiter(s) IntraMuscular once  lidocaine   4% Patch 2 Patch Transdermal daily  polyethylene glycol 3350 17 Gram(s) Oral daily  risperiDONE   Tablet 1 milliGRAM(s) Oral at bedtime  senna 2 Tablet(s) Oral at bedtime    MEDICATIONS  (PRN):  acetaminophen   Tablet .. 975 milliGRAM(s) Oral every 6 hours PRN Mild Pain (1 - 3)  calcium carbonate    500 mG (Tums) Chewable 2 Tablet(s) Chew every 4 hours PRN Heartburn  diphenhydrAMINE   Injectable 50 milliGRAM(s) IntraMuscular every 6 hours PRN Agitation  haloperidol    Injectable 7.5 milliGRAM(s) IntraMuscular every 6 hours PRN Agitation  LORazepam   Injectable 2 milliGRAM(s) IntraMuscular every 6 hours PRN Agitation  
MEDICATIONS  (STANDING):  BACItracin   Ointment 1 Application(s) Topical two times a day  enoxaparin Injectable 30 milliGRAM(s) SubCutaneous two times a day  influenza   Vaccine 0.5 milliLiter(s) IntraMuscular once  lidocaine   4% Patch 2 Patch Transdermal daily  polyethylene glycol 3350 17 Gram(s) Oral daily  senna 2 Tablet(s) Oral at bedtime    MEDICATIONS  (PRN):  acetaminophen   Tablet .. 975 milliGRAM(s) Oral every 6 hours PRN Mild Pain (1 - 3)  calcium carbonate    500 mG (Tums) Chewable 2 Tablet(s) Chew every 4 hours PRN Heartburn  diphenhydrAMINE   Injectable 50 milliGRAM(s) IntraMuscular every 6 hours PRN Agitation  haloperidol    Injectable 7.5 milliGRAM(s) IntraMuscular every 6 hours PRN Agitation  LORazepam   Injectable 2 milliGRAM(s) IntraMuscular every 6 hours PRN Agitation  oxyCODONE    IR 5 milliGRAM(s) Oral every 4 hours PRN Moderate Pain (4 - 6)  oxyCODONE    IR 10 milliGRAM(s) Oral every 4 hours PRN Severe Pain (7 - 10)  
MEDICATIONS  (STANDING):  BACItracin   Ointment 1 Application(s) Topical two times a day  ceFAZolin   IVPB 2000 milliGRAM(s) IV Intermittent once  chlorhexidine 2% Cloths 1 Application(s) Topical <User Schedule>  enoxaparin Injectable 30 milliGRAM(s) SubCutaneous two times a day  influenza   Vaccine 0.5 milliLiter(s) IntraMuscular once  lidocaine   4% Patch 2 Patch Transdermal daily  polyethylene glycol 3350 17 Gram(s) Oral daily  senna 2 Tablet(s) Oral at bedtime    MEDICATIONS  (PRN):  acetaminophen   Tablet .. 975 milliGRAM(s) Oral every 6 hours PRN Mild Pain (1 - 3)  diphenhydrAMINE   Injectable 50 milliGRAM(s) IntraMuscular every 6 hours PRN Agitation  haloperidol    Injectable 7.5 milliGRAM(s) IntraMuscular every 6 hours PRN Agitation  LORazepam   Injectable 2 milliGRAM(s) IntraMuscular every 6 hours PRN Agitation  oxyCODONE    IR 5 milliGRAM(s) Oral every 4 hours PRN Moderate Pain (4 - 6)  oxyCODONE    IR 10 milliGRAM(s) Oral every 4 hours PRN Severe Pain (7 - 10)

## 2021-10-15 NOTE — BH CONSULTATION LIAISON PROGRESS NOTE - NSBHMSETHTPROC_PSY_A_CORE
Disorganized/Flight of ideas/Illogical/Impaired reasoning
Disorganized/Flight of ideas/Illogical/Impaired reasoning
Flight of ideas/Illogical/Impaired reasoning
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
## 2021-10-15 NOTE — BH CONSULTATION LIAISON PROGRESS NOTE - NSICDXBHPRIMARYDX_PSY_ALL_CORE
Schizoaffective disorder   F25.9  

## 2021-10-15 NOTE — BH CONSULTATION LIAISON PROGRESS NOTE - PRN MEDS
Patient given PRN Haldol 7.5, ativan 2mg and Benadryl 50 IM this morning at 8AM 
Haldol 7.5mg  Ativan 2mg IM  Benadryl 50mg IM
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
## 2021-10-15 NOTE — BH CONSULTATION LIAISON PROGRESS NOTE - NSBHFUPINTERVALCCFT_PSY_A_CORE
"I don't need mental health. You're an imbecile if you think I need mental health" "I don't need mental health services. You're an imbecile if you think I need mental health"

## 2021-10-15 NOTE — BH CONSULTATION LIAISON PROGRESS NOTE - NSBHCONSULTIPREASON_PSY_A_CORE
danger to others; mental illness expected to respond to inpatient care
danger to others; mental illness expected to respond to inpatient care
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
## 2021-10-15 NOTE — PROGRESS NOTE ADULT - PROVIDER SPECIALTY LIST ADULT
Trauma Surgery
Trauma Surgery
SICU
SICU
Trauma Surgery

## 2021-10-15 NOTE — BH CONSULTATION LIAISON PROGRESS NOTE - NSBHINDICATION_PSY_ALL_CORE
Until transfer to inpatient psychiatry.
1:1 observation while in the medical floor only.  Elopement precaution  Violence precaution
1:1 observation while in the medical floor only.  Elopement precaution  Violence precaution

## 2021-10-15 NOTE — DISCHARGE NOTE NURSING/CASE MANAGEMENT/SOCIAL WORK - PATIENT PORTAL LINK FT
You can access the FollowMyHealth Patient Portal offered by [facility] by registering at the following website: [email or web address]. By joining [employer]’s FollowMyHealth portal, you will also be able to view your health information using other applications (apps) compatible with our system.

## 2021-10-15 NOTE — BH CONSULTATION LIAISON PROGRESS NOTE - NSBHCHARTREVIEWVS_PSY_A_CORE FT
Vital Signs Last 24 Hrs  T(C): 36.7 (12 Oct 2021 07:29), Max: 37 (11 Oct 2021 21:30)  T(F): 98.1 (12 Oct 2021 07:29), Max: 98.6 (11 Oct 2021 21:30)  HR: 79 (12 Oct 2021 07:29) (79 - 95)  BP: 98/62 (12 Oct 2021 07:29) (98/62 - 107/67)  BP(mean): --  RR: 19 (12 Oct 2021 07:29) (18 - 20)  SpO2: 98% (12 Oct 2021 07:29) (94% - 98%)
No
Vital Signs Last 24 Hrs  T(C): 37.2 (15 Oct 2021 10:38), Max: 37.2 (15 Oct 2021 10:38)  T(F): 99 (15 Oct 2021 10:38), Max: 99 (15 Oct 2021 10:38)  HR: 108 (15 Oct 2021 10:38) (108 - 108)  BP: 94/57 (15 Oct 2021 10:38) (94/57 - 94/57)  BP(mean): --  RR: 18 (15 Oct 2021 10:38) (18 - 18)  SpO2: 94% (15 Oct 2021 10:38) (94% - 94%)
Vital Signs Last 24 Hrs  T(C): 36.9 (08 Oct 2021 12:00), Max: 37.2 (08 Oct 2021 05:11)  T(F): 98.5 (08 Oct 2021 12:00), Max: 98.9 (08 Oct 2021 05:11)  HR: 81 (08 Oct 2021 11:00) (71 - 101)  BP: 105/53 (08 Oct 2021 11:00) (79/59 - 137/86)  BP(mean): 66 (08 Oct 2021 11:00) (57 - 104)  RR: 20 (08 Oct 2021 11:00) (15 - 30)  SpO2: 98% (08 Oct 2021 11:00) (93% - 99%)
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
## 2021-10-15 NOTE — BH CONSULTATION LIAISON PROGRESS NOTE - NSBHCONSULTFOLLOW_PSY_ALL_CORE
Not applicable, patient requires inpatient psychiatric admission...
Not applicable, patient requires inpatient psychiatric admission...
Yes...

## 2021-10-17 ENCOUNTER — EMERGENCY (EMERGENCY)
Facility: HOSPITAL | Age: 36
LOS: 1 days | Discharge: HOME | End: 2021-10-17
Attending: EMERGENCY MEDICINE
Payer: COMMERCIAL

## 2021-10-17 VITALS
WEIGHT: 169.98 LBS | RESPIRATION RATE: 16 BRPM | OXYGEN SATURATION: 99 % | SYSTOLIC BLOOD PRESSURE: 136 MMHG | DIASTOLIC BLOOD PRESSURE: 76 MMHG | HEIGHT: 68 IN | HEART RATE: 68 BPM | TEMPERATURE: 98 F

## 2021-10-17 PROCEDURE — 73130 X-RAY EXAM OF HAND: CPT | Mod: 26,RT

## 2021-10-17 PROCEDURE — 73130 X-RAY EXAM OF HAND: CPT

## 2021-10-17 PROCEDURE — 99283 EMERGENCY DEPT VISIT LOW MDM: CPT | Mod: 25

## 2021-10-17 PROCEDURE — 99283 EMERGENCY DEPT VISIT LOW MDM: CPT

## 2021-10-17 RX ORDER — ACETAMINOPHEN 500 MG
650 TABLET ORAL ONCE
Refills: 0 | Status: COMPLETED | OUTPATIENT
Start: 2021-10-17 | End: 2021-10-17

## 2021-10-17 RX ADMIN — Medication 650 MILLIGRAM(S): at 19:51
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
## 2021-10-17 NOTE — ED PROVIDER NOTE - PATIENT PORTAL LINK FT
You can access the FollowMyHealth Patient Portal offered by [facility] by registering at the following website: [email or web address]. By joining [employer]’s FollowMyHealth portal, you will also be able to view your health information using other applications (apps) compatible with our system.
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
## 2021-10-17 NOTE — ED CLERICAL - NS ED CLERK NOTE PRE-ARRIVAL INFORMATION; ADDITIONAL PRE-ARRIVAL INFORMATION
This patient is eligible for (or currently enrolled in) an outpatient care management program available through [employer]. This program can coordinate outpatient follow up and assist the patient in accessing a variety of outpatient resources.  If discharged from the ED, the patient will be contacted to see if any additional resources are needed.

## 2021-10-17 NOTE — ED PROVIDER NOTE - CARE PROVIDER_API CALL
Kat Regan)  Orthopaedic Surgery; Surgery of the Hand  [address] [place] [address]  Phone: [phone]  Fax: [phone]  Follow Up Time:

## 2021-10-17 NOTE — ED PROVIDER NOTE - PHYSICAL EXAMINATION
Const: Awake, alert and oriented. In no acute distress. Well appearing.  HEENT: NC/AT. Moist mucous membranes.  Eyes: No scleral icterus. EOMI.  Neck:. Soft and supple. Full ROM without pain.  Cardiac: . +S1/S2. No murmurs. Peripheral pulses 2+ and symmetric. No LE edema.  Resp: Speaking in full sentences. No evidence of respiratory distress. No wheezes, rales or rhonchi.  Abd: Soft, non-tender, non-distended. Normal bowel sounds in all 4 quadrants. No guarding or rebound.  MSK: Tenderness over the right 2nd digit DIP, FROM in all extremities neurovasculary intact radial pulse 2+ hand  5/5   Back: Spine midline and non-tender. No CVAT.  Skin: No rashes, abrasions or lacerations.  Lymph: No cervical lymphadenopathy.  Neuro: Awake, alert & oriented x 3. Moves all extremities symmetrically.
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
## 2021-10-18 VITALS
RESPIRATION RATE: 16 BRPM | TEMPERATURE: 98 F | OXYGEN SATURATION: 98 % | DIASTOLIC BLOOD PRESSURE: 67 MMHG | HEART RATE: 70 BPM | SYSTOLIC BLOOD PRESSURE: 142 MMHG

## 2021-10-18 PROBLEM — F31.9 BIPOLAR DISORDER, UNSPECIFIED: Chronic | Status: ACTIVE | Noted: 2021-10-05

## 2021-10-18 NOTE — ED ADULT NURSE NOTE - COVID-19 RESULT
Caller: Jossie Ryan    Relationship: Self    Best call back number: [phone]    What orders are you requesting (i.e. lab or imaging): CT SCAN    In what timeframe would the patient need to come in: ASAP    Where will you receive your lab/imaging services:     Additional notes: PATIENT STATES THAT AT HER LAST VISIT AN ORDER FOR AN MRI WAS PUT IN AND THE PATNENT IS REQUESTING TO CHANGE IT TO A CT SCAN  
Please contact pt to discuss prior messages from today regarding imaging and her concerns. She is now concerned about having multiple CT's.  
NEGATIVE

## 2021-11-02 ENCOUNTER — APPOINTMENT (OUTPATIENT)
Dept: TRAUMA SURGERY | Facility: CLINIC | Age: 36
End: 2021-11-02

## 2021-12-02 NOTE — PROGRESS NOTE BEHAVIORAL HEALTH - NSBHCHARTREVIEWLAB_PSY_A_CORE FT
Patient said they will call back when thy are able to schedule.
13.0   8.63  )-----------( 265      ( 18 Sep 2020 22:35 )             39.0   09-18    140  |  108  |  21  ----------------------------<  107<H>  3.8   |  29  |  0.91    Ca    8.6      18 Sep 2020 22:35  Mg     2.1     09-18
CBC  Chem--WNL
16.2   10.96 )-----------( 323      ( 01 Sep 2020 08:51 )             50.3     CBC Full  -  ( 01 Sep 2020 08:51 )  WBC Count : 10.96 K/uL  RBC Count : 5.48 M/uL  Hemoglobin : 16.2 g/dL  Hematocrit : 50.3 %  Platelet Count - Automated : 323 K/uL  Mean Cell Volume : 91.8 fl  Mean Cell Hemoglobin : 29.6 pg  Mean Cell Hemoglobin Concentration : 32.2 gm/dL  Auto Neutrophil # : 8.14 K/uL  Auto Lymphocyte # : 1.77 K/uL  Auto Monocyte # : 0.91 K/uL  Auto Eosinophil # : 0.04 K/uL  Auto Basophil # : 0.07 K/uL  Auto Neutrophil % : 74.3 %  Auto Lymphocyte % : 16.1 %  Auto Monocyte % : 8.3 %  Auto Eosinophil % : 0.4 %  Auto Basophil % : 0.6 %
CBC  Chem--WNL
13.0   8.63  )-----------( 265      ( 18 Sep 2020 22:35 )             39.0   09-18    140  |  108  |  21  ----------------------------<  107<H>  3.8   |  29  |  0.91    Ca    8.6      18 Sep 2020 22:35  Mg     2.1     09-18
CBC  Chem--WNL
16.2   10.96 )-----------( 323      ( 01 Sep 2020 08:51 )             50.3     CBC Full  -  ( 01 Sep 2020 08:51 )  WBC Count : 10.96 K/uL  RBC Count : 5.48 M/uL  Hemoglobin : 16.2 g/dL  Hematocrit : 50.3 %  Platelet Count - Automated : 323 K/uL  Mean Cell Volume : 91.8 fl  Mean Cell Hemoglobin : 29.6 pg  Mean Cell Hemoglobin Concentration : 32.2 gm/dL  Auto Neutrophil # : 8.14 K/uL  Auto Lymphocyte # : 1.77 K/uL  Auto Monocyte # : 0.91 K/uL  Auto Eosinophil # : 0.04 K/uL  Auto Basophil # : 0.07 K/uL  Auto Neutrophil % : 74.3 %  Auto Lymphocyte % : 16.1 %  Auto Monocyte % : 8.3 %  Auto Eosinophil % : 0.4 %  Auto Basophil % : 0.6 %
CBC  Chem--WNL

## 2021-12-14 NOTE — PROGRESS NOTE BEHAVIORAL HEALTH - NSBHADMITMEDEDUDETAILS_A_CORE FT
----- Message from Fly Myles sent at 5/18/2018  4:32 PM EDT -----  Regarding: Dr. Bonifacio Croft: [phone]  Patient requesting to speak with Dr. Emily Goodwin in regards to her recent test results.
----- Message from Hammad Luu MD sent at 12/14/2021  2:11 PM CST -----  Please let her know staph aureus grew in culture and recommend minocycline 100mg twice daily for 10 days with food   
L/Vm for return call
L/vm to advise of letter sent and may return call if have further questions.
Spoke with patient and conveyed results as dictated by Dr. Luu in note below.   Patient verbalized understanding and verified pharmacy as [facility].     MADELIN      
use of medication and of the potential side effects

## 2022-03-03 NOTE — ED BEHAVIORAL HEALTH ASSESSMENT NOTE - OTHER PAST PSYCHIATRIC HISTORY (INCLUDE DETAILS REGARDING ONSET, COURSE OF ILLNESS, INPATIENT/OUTPATIENT TREATMENT)
Xray at bedside   h/o bipolar disorder / psychosis  admitted 1 year ago to [facility] and started on Risperdal but failed to f/u post dc

## 2022-05-27 ENCOUNTER — EMERGENCY (EMERGENCY)
Facility: HOSPITAL | Age: 37
LOS: 1 days | Discharge: HOME | End: 2022-05-27
Attending: STUDENT IN AN ORGANIZED HEALTH CARE EDUCATION/TRAINING PROGRAM
Payer: COMMERCIAL

## 2022-05-27 VITALS
HEART RATE: 56 BPM | SYSTOLIC BLOOD PRESSURE: 118 MMHG | WEIGHT: 190.04 LBS | RESPIRATION RATE: 20 BRPM | HEIGHT: 68 IN | TEMPERATURE: 98 F | DIASTOLIC BLOOD PRESSURE: 77 MMHG | OXYGEN SATURATION: 99 %

## 2022-05-27 PROCEDURE — 99283 EMERGENCY DEPT VISIT LOW MDM: CPT | Mod: 25

## 2022-05-27 PROCEDURE — 90715 TDAP VACCINE 7 YRS/> IM: CPT

## 2022-05-27 PROCEDURE — 12002 RPR S/N/AX/GEN/TRNK2.6-7.5CM: CPT

## 2022-05-27 PROCEDURE — 90471 IMMUNIZATION ADMIN: CPT

## 2022-05-27 RX ORDER — TETANUS TOXOID, REDUCED DIPHTHERIA TOXOID AND ACELLULAR PERTUSSIS VACCINE, ADSORBED 5; 2.5; 8; 8; 2.5 [IU]/.5ML; [IU]/.5ML; UG/.5ML; UG/.5ML; UG/.5ML
0.5 SUSPENSION INTRAMUSCULAR ONCE
Refills: 0 | Status: COMPLETED | OUTPATIENT
Start: 2022-05-27 | End: 2022-05-27

## 2022-05-27 RX ADMIN — TETANUS TOXOID, REDUCED DIPHTHERIA TOXOID AND ACELLULAR PERTUSSIS VACCINE, ADSORBED 0.5 MILLILITER(S): 5; 2.5; 8; 8; 2.5 SUSPENSION INTRAMUSCULAR at 20:47
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
## 2022-05-27 NOTE — ED PROVIDER NOTE - NSFOLLOWUPINSTRUCTIONS_ED_ALL_ED_FT
Please return to the ED in 10 days for suture removal.  Keep area clean and dry.  Change dressing daily.    Laceration    A laceration is a cut that goes through all of the layers of the skin and into the tissue that is right under the skin. Some lacerations heal on their own. Others need to be closed with skin adhesive strips, skin glue, stitches (sutures), or staples. Proper laceration care minimizes the risk of infection and helps the laceration to heal better.  If non-absorbable stitches or staples have been placed, they must be taken out within the time frame instructed by your healthcare provider.    SEEK IMMEDIATE MEDICAL CARE IF YOU HAVE ANY OF THE FOLLOWING SYMPTOMS: swelling around the wound, worsening pain, drainage from the wound, red streaking going away from your wound, inability to move finger or toe near the laceration, or discoloration of skin near the laceration.

## 2022-05-27 NOTE — ED PROVIDER NOTE - PATIENT PORTAL LINK FT
You can access the FollowMyHealth Patient Portal offered by [facility] by registering at the following website: [email or web address]. By joining [employer]’s FollowMyHealth portal, you will also be able to view your health information using other applications (apps) compatible with our system.

## 2022-05-27 NOTE — ED PROVIDER NOTE - NS ED ROS FT
Gen: denies fever, chills, fatigue, weight loss  Skin: +admit to cut on fifth finger. denies rashes, bruising  HEENT: denies visual changes, ear pain, nasal congestion, throat pain  Respiratory: denies PEDERSEN, SOB, cough, wheezing  Cardiovascular: denies chest pain, palpitations, diaphoresis, LE edema  GI: denies abdominal pain, n/v/d  : denies dysuria, frequency, urgency, bowel/bladder incontinence  MSK: denies joint swelling/pain, back pain, neck pain  Neuro: denies headache, dizziness, weakness, numbness  Psych: denies anxiety, depression, SI/HI, visual/auditory hallucinations

## 2022-05-27 NOTE — ED PROVIDER NOTE - OBJECTIVE STATEMENT
37 year old patient presents with a laceration to his left 5th finger. Pt states he was opening a box with a knife when he slipped and cut his finger. Pt reports he has feeling in his finger and full range of motion. Pt does not recall his last tetanus shot. Pt denies HA, fever, chills, lightheadedness, dizziness, SOB, chest pain.
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
## 2022-05-27 NOTE — ED PROVIDER NOTE - NS ED ATTENDING STATEMENT MOD
This was a shared visit with the JOSEF. I reviewed and verified the documentation and independently performed the documented:

## 2023-09-25 NOTE — H&P ADULT - NSHPPOAPULMEMBOLUS_GEN_A_CORE
Goal Outcome Evaluation:  Plan of Care Reviewed With: patient, spouse           Outcome Evaluation: OT educated pt and spouse on L shoulder precautions, ADL retraining to maintain, sling management and HEP. He tolerated L shoulder PROM , IR chest/ER 30 with good teachback from spouse. Spouse required mod-max assist secure sling straps. He ambulated 4 feet with mod assist demo severe retrograde posture and scored 11 on mobility screen, uses RW at baseline and reports poor balance- recommend PT. Recommend IPR based on present function.      Anticipated Discharge Disposition (OT): inpatient rehabilitation facility   no

## 2024-09-08 NOTE — ED ADULT NURSE NOTE - BRADEN SCORE (IF 18 OR LESS ACTIVATE SKIN INJURY RISK INCREASED GUIDELINE), MLM
Physical Therapy    Patient not seen in therapy.     On hold due to nursing request    PT orders received and chart review completed. Pt currently pending ortho consult with possible OR. Will follow up after consultation with updated weight bearing orders.       OBJECTIVE                         Therapy procedure time and total treatment time can be found documented on the Time Entry flowsheet   22

## 2025-03-17 NOTE — PATIENT PROFILE ADULT - NSPROHMSYMPCOND_GEN_A_NUR
Normal S1 S2, Slight murmur. no rubs, no gallops  Abdomen:   Soft, non tender, no organomegalies, positive bowel sounds  Extremities:   No edema, no cyanosis, good peripheral pulses  Neurological:   Awake, alert, oriented. No obvious focal deficits  Musculoskelatal:  No obvious deformities   /64   Pulse 90   Ht 1.88 m (6' 2\")   Wt (!) 137 kg (302 lb)   BMI 38.77 kg/m²     Assessment:  Assessment & Plan    Diagnosis Orders   1. Coronary artery disease involving native coronary artery of native heart without angina pectoris  EKG 12 lead      2. Primary hypertension  EKG 12 lead      3. Atherosclerosis of native coronary artery of native heart without angina pectoris  EKG 12 lead      4. Essential hypertension  EKG 12 lead      5. Dyslipidemia  EKG 12 lead      6. Shortness of breath  EKG 12 lead      As above  Chronic symptoms   No changes  Low BP  Likely due to weight loss  ECG in office was done today. I reviewed the ECG. No acute findings      Plan:  No follow-ups on file.  Stop imdur   Follow the BP  Adjust as we go   Continue risk factor modification and medical management  Thank you for allowing me to participate in the care of your patient. Please don't hesitate to contact me regarding any further issues related to the patient care    Orders Placed:  Orders Placed This Encounter   Procedures    EKG 12 lead     Reason for Exam?:   Other       Prescribed:  No orders of the defined types were placed in this encounter.         Discussed use, benefit, and side effects of prescribed medications. All patient questions answered. Pt voicedunderstanding. Instructed to continue current medications, diet and exercise. Continue risk factor modification and medical management. Patient agreed with treatment plan. Follow up as directed.    Electronically signedby Yousif Pop MD on 3/17/2025 at 11:30 AM  
behavioral health